# Patient Record
Sex: FEMALE | Race: WHITE | NOT HISPANIC OR LATINO | Employment: FULL TIME | URBAN - NONMETROPOLITAN AREA
[De-identification: names, ages, dates, MRNs, and addresses within clinical notes are randomized per-mention and may not be internally consistent; named-entity substitution may affect disease eponyms.]

---

## 2017-11-07 ENCOUNTER — TRANSCRIBE ORDERS (OUTPATIENT)
Dept: MAMMOGRAPHY | Facility: HOSPITAL | Age: 37
End: 2017-11-07

## 2017-11-07 DIAGNOSIS — Z12.39 BREAST CANCER SCREENING: Primary | ICD-10-CM

## 2017-12-12 ENCOUNTER — HOSPITAL ENCOUNTER (OUTPATIENT)
Dept: MAMMOGRAPHY | Facility: HOSPITAL | Age: 37
Discharge: HOME | End: 2017-12-12
Admitting: OBSTETRICS & GYNECOLOGY

## 2017-12-12 DIAGNOSIS — Z12.39 BREAST CANCER SCREENING: ICD-10-CM

## 2017-12-12 PROCEDURE — G0202 SCR MAMMO BI INCL CAD: HCPCS

## 2017-12-12 PROCEDURE — 77063 BREAST TOMOSYNTHESIS BI: CPT

## 2019-03-18 DIAGNOSIS — M25.522 ARTHRALGIA OF LEFT ELBOW: Primary | ICD-10-CM

## 2019-03-19 ENCOUNTER — TELEPHONE (OUTPATIENT)
Dept: ORTHOPEDIC SURGERY | Facility: CLINIC | Age: 39
End: 2019-03-19

## 2019-03-19 ENCOUNTER — OFFICE VISIT (OUTPATIENT)
Dept: ORTHOPEDIC SURGERY | Facility: CLINIC | Age: 39
End: 2019-03-19

## 2019-03-19 VITALS — WEIGHT: 109.9 LBS | RESPIRATION RATE: 18 BRPM | HEIGHT: 65 IN | BODY MASS INDEX: 18.31 KG/M2

## 2019-03-19 DIAGNOSIS — M77.11 RIGHT TENNIS ELBOW: ICD-10-CM

## 2019-03-19 DIAGNOSIS — M25.521 ARTHRALGIA OF RIGHT ELBOW: Primary | ICD-10-CM

## 2019-03-19 PROCEDURE — 99213 OFFICE O/P EST LOW 20 MIN: CPT | Performed by: PHYSICIAN ASSISTANT

## 2019-03-19 RX ORDER — IBUPROFEN 800 MG/1
TABLET ORAL
COMMUNITY
Start: 2019-01-13 | End: 2019-03-19 | Stop reason: ALTCHOICE

## 2019-03-19 NOTE — PROGRESS NOTES
Subjective   Patient ID: Kate Mckeon is a 38 y.o. right hand dominant female  Pain of the Right Elbow (Patient here today for right elbow pain x several months. No known injury.)         History of Present Illness  ` Patient presents with complaints of right elbow pain that has been ongoing for several months.  There is been no injury or trauma.  She denies numbness or tingling to the upper extremity.  She states daily activities such as opening doors, fixing her hair and getting dressed intensify her symptoms.  Pain Score: (8 with activity, 4-5 at rest)  Pain Location: Elbow  Pain Orientation: Right     Pain Descriptors: Aching, Stabbing, Throbbing  Pain Frequency: Constant/continuous  Pain Onset: Ongoing  Date Pain First Started: (several months)  Clinical Progression: Gradually worsening  Aggravating Factors: (lifting anything, difficulty getting comfortable at night)        Pain Intervention(s): Home medication, Rest, Cold applied, Heat applied(ibuprofen(effective), OTC elbow brace(not effective))  Result of Injury: No       History reviewed. No pertinent past medical history.     Past Surgical History:   Procedure Laterality Date   • DIAGNOSTIC LAPAROSCOPY      for endometriosis   • TUBAL ABDOMINAL LIGATION         Family History   Problem Relation Age of Onset   • Hypertension Father    • Cancer Paternal Grandmother         non-hodgkins lymphoma   • Cancer Paternal Grandfather         prostate       Social History     Socioeconomic History   • Marital status:      Spouse name: Not on file   • Number of children: Not on file   • Years of education: Not on file   • Highest education level: Not on file   Social Needs   • Financial resource strain: Not on file   • Food insecurity - worry: Not on file   • Food insecurity - inability: Not on file   • Transportation needs - medical: Not on file   • Transportation needs - non-medical: Not on file   Occupational History   • Occupation: Vet  "tech/     Employer: [employer]   Tobacco Use   • Smoking status: Former Smoker   • Smokeless tobacco: Never Used   Substance and Sexual Activity   • Alcohol use: Yes     Frequency: Monthly or less   • Drug use: No   • Sexual activity: Not on file   Other Topics Concern   • Not on file   Social History Narrative    Right hand dominant       No current outpatient medications on file.    Allergies   Allergen Reactions   • Demerol [Meperidine] Nausea And Vomiting       Review of Systems   Constitutional: Negative for fever.   HENT: Negative for voice change.    Eyes: Negative for visual disturbance.   Respiratory: Negative for shortness of breath.    Cardiovascular: Negative for chest pain.   Gastrointestinal: Negative for abdominal distention and abdominal pain.   Genitourinary: Negative for dysuria.   Musculoskeletal: Positive for arthralgias. Negative for gait problem and joint swelling.   Skin: Negative for rash.   Neurological: Negative for speech difficulty.   Hematological: Does not bruise/bleed easily.   Psychiatric/Behavioral: Negative for confusion.       Objective   Resp 18   Ht 165.1 cm (65\")   Wt 49.9 kg (109 lb 14.4 oz)   BMI 18.29 kg/m²    Physical Exam   Constitutional: She appears well-nourished.   Eyes: Conjunctivae are normal.   Neck: No tracheal deviation present.   Pulmonary/Chest: Effort normal.   Musculoskeletal:        Right elbow: She exhibits normal range of motion, no swelling, no effusion and no deformity. Tenderness found. Lateral epicondyle tenderness noted. No radial head, no medial epicondyle and no olecranon process tenderness noted.        Right hand: She exhibits normal capillary refill and no swelling. Normal sensation noted. Normal strength noted. She exhibits no thumb/finger opposition.   Neurological: She is alert.   Skin: Capillary refill takes less than 2 seconds.   Psychiatric: She has a normal mood and affect.   Vitals reviewed.    Right Elbow " Exam     Range of Motion   Extension: 0   Flexion: 120   Pronation: normal   Supination: normal     Muscle Strength   The patient has normal right elbow strength.    Tests   Varus: negative  Valgus: negative  Tinel's sign (cubital tunnel): negative    Other   Erythema: absent             Neurologic Exam         Assessment/Plan   Independent Review of Radiographic Studies:    Shows no acute fracture or dislocation.      Procedures       Kate was seen today for pain.    Diagnoses and all orders for this visit:    Arthralgia of right elbow  -     XR Elbow 3+ View Right    Right tennis elbow  -     Ambulatory Referral to Physical Therapy Evaluate and treat, Ortho; Heat       Orthopedic activities reviewed and patient expressed appreciation  Discussion of orthopedic goals  Risk, benefits, and merits of treatment alternatives reviewed with the patient and questions answered  Physical therapy referral given    Recommendations/Plan:  Exercise, medications, injections, other patient advice, and return appointment as noted.  Patient is encouraged to call or return for any issues or concerns.  If no improvement with hand OT and gauntlet wrist brace will consider right elbow lateral epicondylitis injection  Patient agreeable to call or return sooner for any concerns.

## 2019-03-19 NOTE — TELEPHONE ENCOUNTER
Patient called the office in regards to her custom wrist order to take to [facility]. She said that it said left wrist but it needs to be changed to right wrist and sent to [facility].

## 2021-05-18 ENCOUNTER — OFFICE VISIT (OUTPATIENT)
Dept: FAMILY MEDICINE CLINIC | Facility: CLINIC | Age: 41
End: 2021-05-18

## 2021-05-18 VITALS
WEIGHT: 116 LBS | TEMPERATURE: 97.8 F | DIASTOLIC BLOOD PRESSURE: 62 MMHG | HEART RATE: 74 BPM | BODY MASS INDEX: 19.33 KG/M2 | HEIGHT: 65 IN | RESPIRATION RATE: 16 BRPM | OXYGEN SATURATION: 99 % | SYSTOLIC BLOOD PRESSURE: 98 MMHG

## 2021-05-18 DIAGNOSIS — Z11.59 ENCOUNTER FOR HEPATITIS C SCREENING TEST FOR LOW RISK PATIENT: ICD-10-CM

## 2021-05-18 DIAGNOSIS — Z12.31 ENCOUNTER FOR SCREENING MAMMOGRAM FOR MALIGNANT NEOPLASM OF BREAST: ICD-10-CM

## 2021-05-18 DIAGNOSIS — R81 GLUCOSE FOUND IN URINE ON EXAMINATION: ICD-10-CM

## 2021-05-18 DIAGNOSIS — Z83.49 FAMILY HISTORY OF THYROID DISEASE: ICD-10-CM

## 2021-05-18 DIAGNOSIS — Z13.29 SCREENING FOR ENDOCRINE DISORDER: ICD-10-CM

## 2021-05-18 DIAGNOSIS — R53.83 FATIGUE, UNSPECIFIED TYPE: ICD-10-CM

## 2021-05-18 DIAGNOSIS — R51.9 CHRONIC NONINTRACTABLE HEADACHE, UNSPECIFIED HEADACHE TYPE: ICD-10-CM

## 2021-05-18 DIAGNOSIS — G89.29 CHRONIC NONINTRACTABLE HEADACHE, UNSPECIFIED HEADACHE TYPE: ICD-10-CM

## 2021-05-18 DIAGNOSIS — Z13.220 SCREENING FOR HYPERLIPIDEMIA: ICD-10-CM

## 2021-05-18 DIAGNOSIS — K59.00 CONSTIPATION, UNSPECIFIED CONSTIPATION TYPE: ICD-10-CM

## 2021-05-18 DIAGNOSIS — Z00.00 ANNUAL PHYSICAL EXAM: Primary | ICD-10-CM

## 2021-05-18 LAB — HBA1C MFR BLD: 5.1 %

## 2021-05-18 PROCEDURE — 83036 HEMOGLOBIN GLYCOSYLATED A1C: CPT | Performed by: NURSE PRACTITIONER

## 2021-05-18 PROCEDURE — 99396 PREV VISIT EST AGE 40-64: CPT | Performed by: NURSE PRACTITIONER

## 2021-05-18 RX ORDER — DOCUSATE SODIUM 250 MG
250 CAPSULE ORAL 2 TIMES DAILY PRN
Qty: 30 CAPSULE | Refills: 0 | Status: SHIPPED | OUTPATIENT
Start: 2021-05-18

## 2021-05-18 NOTE — PROGRESS NOTES
New Patient History and Physical      Referring Physician: Herminia Esteban APRN    Subjective     Chief Complaint:    Chief Complaint   Patient presents with   • Establish Care     Patient had glucose in urine at  and was referred to check for diabetes.        History of Present Illness:   Here to establish care. Not seen anyone for several years.  She was seen at  for UTI. She had taken azo. UA had 1+ glucose in it. She denies personal hx of diabetes. Sometimes excessive thirst. No polyuria. No hx og gestational diabetes. No problems with vision other than normal nearsightedness.   She has hx of chronic headaches. Gets cluster headaches and migraines at times. Worse with weather changes. She drinks 8oz of water per day. No allergies problems. No neck issues. Sometimes wakes up with a headache. She takes motrin and tylenol. Denies headaches being a bother to her daily life.   She has been constipated over the past week. No diet change. BM yesterday. Has mild abdominal pain. No FH of colon cancer. Has not taken anything for her symptoms. Pain is cramping and sharp.   LMP was 4/27.   Last PAP was 2019, normal  Has hx of breast lump. Has had mammogram a few years back due to lump that resolved. Has not had repeat mammo since being 40.     Review of Systems   Constitutional: Negative for fatigue, unexpected weight gain and unexpected weight loss.   HENT: Negative for congestion and sore throat.    Eyes: Negative for discharge and visual disturbance.   Respiratory: Negative for cough and shortness of breath.    Cardiovascular: Negative for chest pain, palpitations and leg swelling.   Gastrointestinal: Positive for abdominal pain and constipation. Negative for diarrhea, nausea, vomiting and GERD.   Endocrine: Negative for polydipsia, polyphagia and polyuria.   Genitourinary: Negative for decreased urine volume, dysuria, frequency, hematuria and urgency.   Musculoskeletal: Negative for arthralgias, joint  "swelling and myalgias.   Skin: Negative for rash.   Neurological: Positive for headache. Negative for dizziness and light-headedness.   Hematological: Negative for adenopathy. Does not bruise/bleed easily.   Psychiatric/Behavioral: Negative for depressed mood. The patient is not nervous/anxious.         Past Medical History: History reviewed. No pertinent past medical history.    Past Surgical History:  Past Surgical History:   Procedure Laterality Date   • DIAGNOSTIC LAPAROSCOPY      for endometriosis   • TUBAL ABDOMINAL LIGATION         Family History: family history includes Cancer in her paternal grandfather and paternal grandmother; Hypertension in her father.    Social History:  reports that she has quit smoking. She has never used smokeless tobacco. She reports current alcohol use. She reports that she does not use drugs.    Medications:    Current Outpatient Medications:   •  docusate sodium (COLACE) 250 MG capsule, Take 1 capsule by mouth 2 (Two) Times a Day As Needed for Constipation., Disp: 30 capsule, Rfl: 0    Allergies:  Allergies   Allergen Reactions   • Demerol [Meperidine] Nausea And Vomiting       Objective     Vital Signs:   Vitals:    05/18/21 1108   BP: 98/62   Pulse: 74   Resp: 16   Temp: 97.8 °F (36.6 °C)   SpO2: 99%   Weight: 52.6 kg (116 lb)   Height: 165.1 cm (65\")       Physical Exam:    Physical Exam  Vitals and nursing note reviewed.   Constitutional:       Appearance: She is well-developed.   HENT:      Head: Normocephalic and atraumatic.      Right Ear: Tympanic membrane, ear canal and external ear normal.      Left Ear: Tympanic membrane, ear canal and external ear normal.      Nose: Nose normal.      Mouth/Throat:      Pharynx: Uvula midline.   Eyes:      General: Lids are normal. No scleral icterus.     Conjunctiva/sclera: Conjunctivae normal.      Pupils: Pupils are equal, round, and reactive to light.   Neck:      Thyroid: No thyromegaly.      Vascular: No carotid bruit.      " Trachea: No tracheal deviation.   Cardiovascular:      Rate and Rhythm: Normal rate and regular rhythm.      Heart sounds: Normal heart sounds. No murmur heard.   No friction rub. No gallop.    Pulmonary:      Effort: Pulmonary effort is normal.      Breath sounds: Normal breath sounds.   Abdominal:      General: Bowel sounds are normal. There is no distension.      Palpations: Abdomen is soft.      Tenderness: There is no abdominal tenderness.   Musculoskeletal:      Cervical back: Neck supple.   Lymphadenopathy:      Head:      Right side of head: No submental, submandibular, tonsillar, preauricular or posterior auricular adenopathy.      Left side of head: No submental, submandibular, tonsillar, preauricular or posterior auricular adenopathy.      Cervical: No cervical adenopathy.   Skin:     General: Skin is warm and dry.      Findings: No rash.   Neurological:      Mental Status: She is alert and oriented to person, place, and time.      Cranial Nerves: No cranial nerve deficit.      Sensory: No sensory deficit.   Psychiatric:         Behavior: Behavior normal.         Assessment / Plan     Assessment/Plan:   Problem List Items Addressed This Visit     None      Visit Diagnoses     Annual physical exam    -  Primary    Glucose found in urine on examination        Relevant Orders    POC Glycosylated Hemoglobin (Hb A1C) (Completed)    Encounter for screening mammogram for malignant neoplasm of breast        Relevant Orders    Mammo Screening Digital Tomosynthesis Bilateral With CAD    Chronic nonintractable headache, unspecified headache type        Constipation, unspecified constipation type        Relevant Medications    docusate sodium (COLACE) 250 MG capsule    Screening for endocrine disorder        Relevant Orders    TSH Rfx On Abnormal To Free T4    Family history of thyroid disease        Relevant Orders    TSH Rfx On Abnormal To Free T4    Fatigue, unspecified type        Relevant Orders    Comprehensive  Metabolic Panel    CBC Auto Differential    TSH Rfx On Abnormal To Free T4    Vitamin B12    Vitamin D 25 Hydroxy    Folate    Screening for hyperlipidemia        Relevant Orders    Lipid Panel    Encounter for hepatitis C screening test for low risk patient        Relevant Orders    Hepatitis C Antibody        -- physical performed today, encouraged clean eating, whole foods, limit processed and sugary foods, exercise 150min/week as tolerated  -- A1c 5.1. Discussed abnormality in urine was likely due to AZO use  -- labs as above  -- intergrative medicine treatment for headaches discussed  -- matt for constipation, increase water and fiber intake    I have reviewed pertinent health maintenance applicable to this patient.    Follow up:  Return in about 1 year (around 5/18/2022).    Electronically signed by ISABEL Stevenson   05/18/2021 11:22 EDT      Please note that portions of this note may have been completed with a voice recognition program. Efforts were made to edit the dictations, but occasionally words are mistranscribed.

## 2021-05-19 LAB
25(OH)D3+25(OH)D2 SERPL-MCNC: 46.9 NG/ML (ref 30–100)
ALBUMIN SERPL-MCNC: 4.4 G/DL (ref 3.5–5.2)
ALBUMIN/GLOB SERPL: 1.9 G/DL
ALP SERPL-CCNC: 81 U/L (ref 39–117)
ALT SERPL-CCNC: 12 U/L (ref 1–33)
AST SERPL-CCNC: 12 U/L (ref 1–32)
BASOPHILS # BLD AUTO: 0.07 10*3/MM3 (ref 0–0.2)
BASOPHILS NFR BLD AUTO: 0.9 % (ref 0–1.5)
BILIRUB SERPL-MCNC: 0.4 MG/DL (ref 0–1.2)
BUN SERPL-MCNC: 16 MG/DL (ref 6–20)
BUN/CREAT SERPL: 23.9 (ref 7–25)
CALCIUM SERPL-MCNC: 9.8 MG/DL (ref 8.6–10.5)
CHLORIDE SERPL-SCNC: 103 MMOL/L (ref 98–107)
CHOLEST SERPL-MCNC: 150 MG/DL (ref 0–200)
CO2 SERPL-SCNC: 27.8 MMOL/L (ref 22–29)
CREAT SERPL-MCNC: 0.67 MG/DL (ref 0.57–1)
EOSINOPHIL # BLD AUTO: 0.19 10*3/MM3 (ref 0–0.4)
EOSINOPHIL NFR BLD AUTO: 2.4 % (ref 0.3–6.2)
ERYTHROCYTE [DISTWIDTH] IN BLOOD BY AUTOMATED COUNT: 12 % (ref 12.3–15.4)
FOLATE SERPL-MCNC: 10.8 NG/ML (ref 4.78–24.2)
GLOBULIN SER CALC-MCNC: 2.3 GM/DL
GLUCOSE SERPL-MCNC: 63 MG/DL (ref 65–99)
HCT VFR BLD AUTO: 42.6 % (ref 34–46.6)
HCV AB S/CO SERPL IA: 0.1 S/CO RATIO (ref 0–0.9)
HDLC SERPL-MCNC: 63 MG/DL (ref 40–60)
HGB BLD-MCNC: 14.3 G/DL (ref 12–15.9)
IMM GRANULOCYTES # BLD AUTO: 0.03 10*3/MM3 (ref 0–0.05)
IMM GRANULOCYTES NFR BLD AUTO: 0.4 % (ref 0–0.5)
LDLC SERPL CALC-MCNC: 73 MG/DL (ref 0–100)
LYMPHOCYTES # BLD AUTO: 1.51 10*3/MM3 (ref 0.7–3.1)
LYMPHOCYTES NFR BLD AUTO: 19.4 % (ref 19.6–45.3)
MCH RBC QN AUTO: 30.4 PG (ref 26.6–33)
MCHC RBC AUTO-ENTMCNC: 33.6 G/DL (ref 31.5–35.7)
MCV RBC AUTO: 90.6 FL (ref 79–97)
MONOCYTES # BLD AUTO: 0.77 10*3/MM3 (ref 0.1–0.9)
MONOCYTES NFR BLD AUTO: 9.9 % (ref 5–12)
NEUTROPHILS # BLD AUTO: 5.23 10*3/MM3 (ref 1.7–7)
NEUTROPHILS NFR BLD AUTO: 67 % (ref 42.7–76)
NRBC BLD AUTO-RTO: 0 /100 WBC (ref 0–0.2)
PLATELET # BLD AUTO: 271 10*3/MM3 (ref 140–450)
POTASSIUM SERPL-SCNC: 4.6 MMOL/L (ref 3.5–5.2)
PROT SERPL-MCNC: 6.7 G/DL (ref 6–8.5)
RBC # BLD AUTO: 4.7 10*6/MM3 (ref 3.77–5.28)
SODIUM SERPL-SCNC: 138 MMOL/L (ref 136–145)
TRIGL SERPL-MCNC: 68 MG/DL (ref 0–150)
TSH SERPL DL<=0.005 MIU/L-ACNC: 1.05 UIU/ML (ref 0.27–4.2)
VIT B12 SERPL-MCNC: 518 PG/ML (ref 211–946)
VLDLC SERPL CALC-MCNC: 14 MG/DL (ref 5–40)
WBC # BLD AUTO: 7.8 10*3/MM3 (ref 3.4–10.8)

## 2021-05-21 NOTE — PROGRESS NOTES
Labs are unremarkable other than a mildly decreased glucose.  Make sure she is not going prolonged periods of time without eating.

## 2021-09-10 ENCOUNTER — TRANSCRIBE ORDERS (OUTPATIENT)
Dept: LAB | Facility: HOSPITAL | Age: 41
End: 2021-09-10

## 2021-09-10 ENCOUNTER — LAB (OUTPATIENT)
Dept: LAB | Facility: HOSPITAL | Age: 41
End: 2021-09-10

## 2021-09-10 DIAGNOSIS — Z20.822 COVID-19 RULED OUT: ICD-10-CM

## 2021-09-10 DIAGNOSIS — Z20.822 COVID-19 RULED OUT: Primary | ICD-10-CM

## 2021-09-10 PROCEDURE — U0004 COV-19 TEST NON-CDC HGH THRU: HCPCS

## 2021-09-11 LAB — SARS-COV-2 RNA NOSE QL NAA+PROBE: DETECTED

## 2021-09-13 ENCOUNTER — TELEPHONE (OUTPATIENT)
Dept: OTHER | Facility: HOSPITAL | Age: 41
End: 2021-09-13

## 2021-12-26 PROCEDURE — U0004 COV-19 TEST NON-CDC HGH THRU: HCPCS | Performed by: NURSE PRACTITIONER

## 2021-12-28 ENCOUNTER — OFFICE VISIT (OUTPATIENT)
Dept: FAMILY MEDICINE CLINIC | Facility: CLINIC | Age: 41
End: 2021-12-28

## 2021-12-28 VITALS
SYSTOLIC BLOOD PRESSURE: 126 MMHG | OXYGEN SATURATION: 99 % | TEMPERATURE: 97.1 F | HEART RATE: 71 BPM | DIASTOLIC BLOOD PRESSURE: 90 MMHG

## 2021-12-28 DIAGNOSIS — J02.9 SORE THROAT: ICD-10-CM

## 2021-12-28 DIAGNOSIS — R49.0 SOFT HOARSE VOICE: Primary | ICD-10-CM

## 2021-12-28 DIAGNOSIS — Z20.822 EXPOSURE TO COVID-19 VIRUS: ICD-10-CM

## 2021-12-28 DIAGNOSIS — J34.89 NASAL CONGESTION WITH RHINORRHEA: ICD-10-CM

## 2021-12-28 DIAGNOSIS — R05.9 COUGH: ICD-10-CM

## 2021-12-28 DIAGNOSIS — R09.81 NASAL CONGESTION WITH RHINORRHEA: ICD-10-CM

## 2021-12-28 PROCEDURE — 99213 OFFICE O/P EST LOW 20 MIN: CPT

## 2021-12-28 PROCEDURE — U0004 COV-19 TEST NON-CDC HGH THRU: HCPCS

## 2021-12-28 RX ORDER — DEXAMETHASONE 0.5 MG/1
TABLET ORAL
Qty: 21 TABLET | Refills: 0 | Status: SHIPPED | OUTPATIENT
Start: 2021-12-28 | End: 2022-01-11

## 2021-12-28 NOTE — PROGRESS NOTES
Acute Office Visit      Patient Name: Kate Mckeon  : 1980   MRN: [id number]     Chief Complaint:    Chief Complaint   Patient presents with   • Nasal Congestion     no drainage    • Sore Throat   • Cough     dry tight    • Shortness of Breath     when up moiving around        History of Present Illness: Kate Mckeon is a 41 y.o. female who is here today for nasal congestion, sore throat, cough, and shortness of breath on exertion.  She reports that there was a coworker at work who was positive for Covid that she was exposed to.  She was tested on Friday and had a negative result.  On  she went to [facility] and had a negative test result that was given to her on Monday.  However, later that night on  she took a home test and was negative.  On Monday she was in close proximity to a another coworker who tested positive.  The patient reports that her symptoms started Darwin Day.  She notes that her voice is very hoarse and soft, she will have some burning in her chest due to coughing and it is worse in the morning.  She denies facial pain ear pain.  She still has her sense of taste and smell.    Subjective     Review of System: Review of Systems   Constitutional: Negative for chills and fever.   HENT: Positive for congestion and sore throat. Negative for ear pain, postnasal drip, rhinorrhea, sinus pressure and sinus pain.    Respiratory: Positive for cough and shortness of breath (LEDEZMA).    Cardiovascular: Negative for chest pain.   Gastrointestinal: Negative for constipation, diarrhea, nausea and vomiting.   Musculoskeletal: Negative for arthralgias and myalgias.   Neurological: Negative for headaches.      I have reviewed the ROS documented by my clinical staff, updated appropriately and I agree. ISABEL Aguirre    Past Medical History: History reviewed. No pertinent past medical history.    Past Surgical History:   Past Surgical History:   Procedure  Laterality Date   • DIAGNOSTIC LAPAROSCOPY      for endometriosis   • TUBAL ABDOMINAL LIGATION         Family History:   Family History   Problem Relation Age of Onset   • Hypertension Father    • Cancer Paternal Grandmother         non-hodgkins lymphoma   • Cancer Paternal Grandfather         prostate       Social History:   Social History     Socioeconomic History   • Marital status:    Tobacco Use   • Smoking status: Former Smoker   • Smokeless tobacco: Never Used   Substance and Sexual Activity   • Alcohol use: Yes   • Drug use: No   • Sexual activity: Defer       Medications:     Current Outpatient Medications:   •  docusate sodium (COLACE) 250 MG capsule, Take 1 capsule by mouth 2 (Two) Times a Day As Needed for Constipation., Disp: 30 capsule, Rfl: 0  •  dexamethasone (DECADRON) 0.5 MG tablet, 6 po x1d; then 5 po x 1d; then 4 po x 1d; then 3 po x 1d; then 2 po x 1d; then 1 po x1d; then STOP, Disp: 21 tablet, Rfl: 0  •  Lidocaine Viscous HCl (XYLOCAINE) 2 % solution, Take 5 mL by mouth Every 4 (Four) Hours As Needed for Mild Pain ., Disp: 60 mL, Rfl: 0    Allergies:   Allergies   Allergen Reactions   • Demerol [Meperidine] Nausea And Vomiting       Objective     Physical Exam:   Vital Signs:   Vitals:    12/28/21 1525   BP: 126/90   Pulse: 71   Temp: 97.1 °F (36.2 °C)   SpO2: 99%     There is no height or weight on file to calculate BMI.     Physical Exam  Vitals and nursing note reviewed.   Constitutional:       Appearance: Normal appearance.   HENT:      Head: Normocephalic and atraumatic.      Right Ear: Tympanic membrane, ear canal and external ear normal.      Left Ear: Tympanic membrane, ear canal and external ear normal.      Nose: Nose normal.      Mouth/Throat:      Mouth: Mucous membranes are moist.      Pharynx: Oropharynx is clear.   Eyes:      Extraocular Movements: Extraocular movements intact.      Conjunctiva/sclera: Conjunctivae normal.      Pupils: Pupils are equal, round, and reactive  to light.   Neck:      Vascular: No carotid bruit.   Cardiovascular:      Rate and Rhythm: Normal rate and regular rhythm.      Pulses: Normal pulses.      Heart sounds: Normal heart sounds.   Pulmonary:      Effort: Pulmonary effort is normal.      Breath sounds: Normal breath sounds.   Abdominal:      General: Abdomen is flat. Bowel sounds are normal. There is no distension.      Palpations: Abdomen is soft.      Tenderness: There is no abdominal tenderness.   Musculoskeletal:      Cervical back: Neck supple. No tenderness.      Right lower leg: No edema.      Left lower leg: No edema.   Lymphadenopathy:      Cervical: No cervical adenopathy.   Skin:     General: Skin is warm and dry.      Capillary Refill: Capillary refill takes less than 2 seconds.   Neurological:      General: No focal deficit present.      Mental Status: She is alert and oriented to person, place, and time.   Psychiatric:         Mood and Affect: Mood normal.         Behavior: Behavior normal.         Assessment / Plan      Assessment/Plan:   Diagnoses and all orders for this visit:    1. Soft hoarse voice (Primary)  -     COVID-19 PCR, [facility], NP SWAB IN [facility] VIRAL TRANSPORT MEDIA/ORAL SWISH 24-30 HR TAT - Swab, Nasopharynx; Future  -     dexamethasone (DECADRON) 0.5 MG tablet; 6 po x1d; then 5 po x 1d; then 4 po x 1d; then 3 po x 1d; then 2 po x 1d; then 1 po x1d; then STOP  Dispense: 21 tablet; Refill: 0  -     COVID-19 PCR, [facility], NP SWAB IN [facility] VIRAL TRANSPORT MEDIA/ORAL SWISH 24-30 HR TAT - Swab, Nasopharynx    2. Nasal congestion with rhinorrhea  -     COVID-19 PCR, [facility], NP SWAB IN [facility] VIRAL TRANSPORT MEDIA/ORAL SWISH 24-30 HR TAT - Swab, Nasopharynx; Future  -     dexamethasone (DECADRON) 0.5 MG tablet; 6 po x1d; then 5 po x 1d; then 4 po x 1d; then 3 po x 1d; then 2 po x 1d; then 1 po x1d; then STOP  Dispense: 21 tablet; Refill: 0  -     COVID-19 PCR, [facility], NP SWAB IN [facility] VIRAL TRANSPORT MEDIA/ORAL SWISH  24-30 HR TAT - Swab, Nasopharynx    3. Cough  -     COVID-19 PCR, [facility], NP SWAB IN [facility] VIRAL TRANSPORT MEDIA/ORAL SWISH 24-30 HR TAT - Swab, Nasopharynx; Future  -     dexamethasone (DECADRON) 0.5 MG tablet; 6 po x1d; then 5 po x 1d; then 4 po x 1d; then 3 po x 1d; then 2 po x 1d; then 1 po x1d; then STOP  Dispense: 21 tablet; Refill: 0  -     COVID-19 PCR, [facility], NP SWAB IN [facility] VIRAL TRANSPORT MEDIA/ORAL SWISH 24-30 HR TAT - Swab, Nasopharynx    4. Sore throat  -     COVID-19 PCR, [facility], NP SWAB IN [facility] VIRAL TRANSPORT MEDIA/ORAL SWISH 24-30 HR TAT - Swab, Nasopharynx; Future  -     dexamethasone (DECADRON) 0.5 MG tablet; 6 po x1d; then 5 po x 1d; then 4 po x 1d; then 3 po x 1d; then 2 po x 1d; then 1 po x1d; then STOP  Dispense: 21 tablet; Refill: 0  -     COVID-19 PCR, [facility], NP SWAB IN [facility] VIRAL TRANSPORT MEDIA/ORAL SWISH 24-30 HR TAT - Swab, Nasopharynx    5. Exposure to COVID-19 virus  -     COVID-19 PCR, [facility], NP SWAB IN [facility] VIRAL TRANSPORT MEDIA/ORAL SWISH 24-30 HR TAT - Swab, Nasopharynx; Future  -     dexamethasone (DECADRON) 0.5 MG tablet; 6 po x1d; then 5 po x 1d; then 4 po x 1d; then 3 po x 1d; then 2 po x 1d; then 1 po x1d; then STOP  Dispense: 21 tablet; Refill: 0  -     COVID-19 PCR, [facility], NP SWAB IN [facility] V[facility]AL TRANSPORT MEDIA/ORAL SWISH 24-30 HR TAT - Swab, Nasopharynx         1. She is requesting another Covid swab.  Will follow up with results.  2. Will treat symptoms with dexamethasone taper dose pack.  Also recommend using over-the-counter medications to help with symptoms such as Flonase, decongestants, and Tylenol and ibuprofen as needed for pain.      Follow Up:   Return if symptoms worsen or fail to improve.    I spent approximately 20 minutes providing clinical care for this patient; including review of patient's chart and provider documentation, face to face time spent with patient in examination room (obtaining history, performing physical exam,  discussing diagnosis and management options), placing orders, and completing patient documentation.     ISABEL Aguirre  [facility] [place]

## 2021-12-29 ENCOUNTER — TELEPHONE (OUTPATIENT)
Dept: FAMILY MEDICINE CLINIC | Facility: CLINIC | Age: 41
End: 2021-12-29

## 2021-12-29 LAB — SARS-COV-2 RNA NOSE QL NAA+PROBE: NOT DETECTED

## 2021-12-29 NOTE — TELEPHONE ENCOUNTER
Caller: Kate Mckeon    Relationship: Self    Best call back number: [phone]    Caller requesting test results: YES    What test was performed: COVID -19 TEST    When was the test performed: 12/28/21

## 2022-01-11 ENCOUNTER — OFFICE VISIT (OUTPATIENT)
Dept: ORTHOPEDIC SURGERY | Facility: CLINIC | Age: 42
End: 2022-01-11

## 2022-01-11 VITALS — HEIGHT: 65 IN | BODY MASS INDEX: 19.39 KG/M2 | TEMPERATURE: 98.4 F | WEIGHT: 116.4 LBS

## 2022-01-11 DIAGNOSIS — M25.531 ARTHRALGIA OF RIGHT WRIST: Primary | ICD-10-CM

## 2022-01-11 DIAGNOSIS — M77.8 RIGHT WRIST TENDINITIS: ICD-10-CM

## 2022-01-11 DIAGNOSIS — S66.911A WRIST STRAIN, RIGHT, INITIAL ENCOUNTER: ICD-10-CM

## 2022-01-11 PROCEDURE — 99203 OFFICE O/P NEW LOW 30 MIN: CPT | Performed by: PHYSICIAN ASSISTANT

## 2022-01-11 NOTE — PROGRESS NOTES
Subjective   Patient ID: Kate Mckeon is a 41 y.o. right hand dominant female  Pain of the Right Wrist (States she doesn't know what she has done to it, it has been hurting for a couple months. She's having problems grabbing, pulling or lifting anything. Having has throbbing up her arm.)         History of Present Illness  Presents with right lateral wrist pain ongoing for 1 month. She thinks maybe after manually cleaning her carpet she developed the right wrist arthralgia. Denies numbness, tingling or redness.                                                    History reviewed. No pertinent past medical history.     Past Surgical History:   Procedure Laterality Date   • DIAGNOSTIC LAPAROSCOPY      for endometriosis   • TUBAL ABDOMINAL LIGATION         Family History   Problem Relation Age of Onset   • Hypertension Father    • Cancer Paternal Grandmother         non-hodgkins lymphoma   • Cancer Paternal Grandfather         prostate       Social History     Socioeconomic History   • Marital status:    Tobacco Use   • Smoking status: Former Smoker   • Smokeless tobacco: Never Used   Substance and Sexual Activity   • Alcohol use: Yes   • Drug use: No   • Sexual activity: Defer         Current Outpatient Medications:   •  docusate sodium (COLACE) 250 MG capsule, Take 1 capsule by mouth 2 (Two) Times a Day As Needed for Constipation., Disp: 30 capsule, Rfl: 0    Allergies   Allergen Reactions   • Demerol [Meperidine] Nausea And Vomiting       Review of Systems   Constitutional: Negative for fever.   HENT: Negative for dental problem and voice change.    Eyes: Negative for visual disturbance.   Respiratory: Negative for shortness of breath.    Cardiovascular: Negative for chest pain.   Gastrointestinal: Negative for abdominal pain.   Genitourinary: Negative for dysuria.   Musculoskeletal: Positive for arthralgias (right wrist). Negative for gait problem and joint swelling.   Skin: Negative for rash.  "  Neurological: Negative for speech difficulty.   Hematological: Does not bruise/bleed easily.   Psychiatric/Behavioral: Negative for confusion.       I have reviewed the medical and surgical history, family history, social history, medications, and/or allergies, and the review of systems of this report.    Objective   Temp 98.4 °F (36.9 °C)   Ht 165.1 cm (65\")   Wt 52.8 kg (116 lb 6.4 oz)   BMI 19.37 kg/m²    Physical Exam  Vitals and nursing note reviewed.   Constitutional:       Appearance: Normal appearance.   Pulmonary:      Effort: Pulmonary effort is normal.   Musculoskeletal:      Right elbow: No swelling or deformity. Normal range of motion. No tenderness.      Right wrist: Tenderness present. No swelling, deformity, snuff box tenderness or crepitus. Normal pulse.      Right hand: Normal strength. Normal sensation. There is no disruption of two-point discrimination. Normal capillary refill. Normal pulse.   Neurological:      Mental Status: She is alert and oriented to person, place, and time.       Right Elbow Exam     Range of Motion   The patient has normal right elbow ROM.    Muscle Strength   The patient has normal right elbow strength.    Tests   Tinel's sign (cubital tunnel): negative    Other   Sensation: normal             Neurologic Exam     Mental Status   Oriented to person, place, and time.        No anatomical snuffbox ttp   Neg. Aneesh's test      Assessment/Plan   Independent Review of Radiographic Studies:    X-ray of the right wrist 3 view performed in the office independently reviewed for the evaluation of wrist pain.  No comparison films available to view.  No acute osseous abnormality noted      Procedures       Diagnoses and all orders for this visit:    1. Arthralgia of right wrist (Primary)  -     XR Wrist 3+ View Right; Future    2. Right wrist tendinitis    3. Wrist strain, right, initial encounter       Orthopedic activities reviewed and patient expressed appreciation  Discussion " of orthopedic goals  Risk, benefits, and merits of treatment alternatives reviewed with the patient and questions answered  Use brace as instructed  Reduced physical activity as appropriate  Weight bearing parameters reviewed  Ice, heat, and/or modalities as beneficial    Recommendations/Plan:  Exercise, medications, injections, other patient advice, and return appointment as noted.  Patient is encouraged to call or return for any issues or concerns.   Use otc motrin as directed otc  Patient was given a velcro brace to wear 24 hours per day.   Follow up in 2 weeks may enroll in PT  Patient agreeable to call or return sooner for any concerns.               EMR Dragon-transcription disclaimer:  This encounter note is an electronic transcription of spoken language to printed text.  Electronic transcription of spoken language may permit erroneous or at times nonsensical words or phrases to be inadvertently transcribed.  Although I have reviewed the note for such errors, some may still exist

## 2022-01-28 ENCOUNTER — OFFICE VISIT (OUTPATIENT)
Dept: FAMILY MEDICINE CLINIC | Facility: CLINIC | Age: 42
End: 2022-01-28

## 2022-01-28 VITALS
HEIGHT: 65 IN | TEMPERATURE: 97.8 F | DIASTOLIC BLOOD PRESSURE: 61 MMHG | BODY MASS INDEX: 19.26 KG/M2 | HEART RATE: 72 BPM | SYSTOLIC BLOOD PRESSURE: 91 MMHG | OXYGEN SATURATION: 98 % | WEIGHT: 115.6 LBS

## 2022-01-28 DIAGNOSIS — R05.9 COUGH: ICD-10-CM

## 2022-01-28 DIAGNOSIS — Z20.822 EXPOSURE TO CONFIRMED CASE OF COVID-19: Primary | ICD-10-CM

## 2022-01-28 DIAGNOSIS — H65.02 NON-RECURRENT ACUTE SEROUS OTITIS MEDIA OF LEFT EAR: ICD-10-CM

## 2022-01-28 LAB — SARS-COV-2 RNA PNL SPEC NAA+PROBE: DETECTED

## 2022-01-28 PROCEDURE — 99212 OFFICE O/P EST SF 10 MIN: CPT

## 2022-01-28 PROCEDURE — 87635 SARS-COV-2 COVID-19 AMP PRB: CPT

## 2022-01-28 RX ORDER — CEFDINIR 300 MG/1
300 CAPSULE ORAL 2 TIMES DAILY
Qty: 14 CAPSULE | Refills: 0 | Status: SHIPPED | OUTPATIENT
Start: 2022-01-28 | End: 2022-02-08

## 2022-01-28 NOTE — PROGRESS NOTES
Acute Office Visit      Patient Name: Kate Mckeon  : 1980   MRN: [id number]     Chief Complaint:    Chief Complaint   Patient presents with   • Cough     Patient complains of cough, headache, and body aches.   • Headache       History of Present Illness: Kate Mckeon is a 41 y.o. female who is here today for cough and headache.  Patient states that she has had 2 exposures, her first 1 was on Monday and then her second exposure was on Thursday.  Her  tested positive today for Covid.  She reports that her symptoms started Wednesday with a nonproductive cough, intermittent sore throat, mild congestion, generalized body aches, and headache.  No fever, some chills, and today has noticed some ear pain.  She reports that she still has her sense of taste and smell.  She has had Covid previously in September of last year.    Subjective     Review of System: Review of Systems   Constitutional: Positive for chills. Negative for fatigue and fever.   HENT: Positive for congestion, ear pain and sore throat. Negative for postnasal drip, rhinorrhea, sinus pressure and sinus pain.    Respiratory: Positive for cough. Negative for shortness of breath.    Cardiovascular: Negative for chest pain.   Gastrointestinal: Negative for abdominal pain, diarrhea, nausea and vomiting.   Musculoskeletal: Positive for myalgias.   Neurological: Positive for headaches.      I have reviewed the ROS documented by my clinical staff, updated appropriately and I agree. ISABEL Aguirre    Past Medical History: History reviewed. No pertinent past medical history.    Past Surgical History:   Past Surgical History:   Procedure Laterality Date   • DIAGNOSTIC LAPAROSCOPY      for endometriosis   • TUBAL ABDOMINAL LIGATION         Family History:   Family History   Problem Relation Age of Onset   • Hypertension Father    • Cancer Paternal Grandmother         non-hodgkins lymphoma   • Cancer Paternal Grandfather          "prostate       Social History:   Social History     Socioeconomic History   • Marital status:    Tobacco Use   • Smoking status: Former Smoker   • Smokeless tobacco: Never Used   Substance and Sexual Activity   • Alcohol use: Yes   • Drug use: No   • Sexual activity: Defer       Medications:     Current Outpatient Medications:   •  docusate sodium (COLACE) 250 MG capsule, Take 1 capsule by mouth 2 (Two) Times a Day As Needed for Constipation., Disp: 30 capsule, Rfl: 0  •  cefdinir (OMNICEF) 300 MG capsule, Take 1 capsule by mouth 2 (Two) Times a Day., Disp: 14 capsule, Rfl: 0    Allergies:   Allergies   Allergen Reactions   • Demerol [Meperidine] Nausea And Vomiting       Objective     Physical Exam:   Vital Signs:   Vitals:    01/28/22 1306   BP: 91/61   BP Location: Right arm   Patient Position: Sitting   Cuff Size: Adult   Pulse: 72   Temp: 97.8 °F (36.6 °C)   TempSrc: Infrared   SpO2: 98%   Weight: 52.4 kg (115 lb 9.6 oz)   Height: 165.1 cm (65\")     Body mass index is 19.24 kg/m².     Physical Exam  Vitals and nursing note reviewed.   Constitutional:       Appearance: Normal appearance.   HENT:      Head: Normocephalic and atraumatic.      Right Ear: Tympanic membrane, ear canal and external ear normal.      Left Ear: Ear canal and external ear normal. A middle ear effusion is present. Tympanic membrane is erythematous.      Nose: Nose normal.      Mouth/Throat:      Mouth: Mucous membranes are moist.      Pharynx: Oropharynx is clear.   Eyes:      Extraocular Movements: Extraocular movements intact.      Conjunctiva/sclera: Conjunctivae normal.      Pupils: Pupils are equal, round, and reactive to light.   Neck:      Vascular: No carotid bruit.   Cardiovascular:      Rate and Rhythm: Normal rate and regular rhythm.      Pulses: Normal pulses.      Heart sounds: Normal heart sounds.   Pulmonary:      Effort: Pulmonary effort is normal.      Breath sounds: Normal breath sounds.   Abdominal:      General: " Abdomen is flat. Bowel sounds are normal. There is no distension.      Palpations: Abdomen is soft.      Tenderness: There is no abdominal tenderness.   Musculoskeletal:      Cervical back: Neck supple. No tenderness.      Right lower leg: No edema.      Left lower leg: No edema.   Lymphadenopathy:      Cervical: No cervical adenopathy.   Skin:     General: Skin is warm and dry.      Capillary Refill: Capillary refill takes less than 2 seconds.   Neurological:      General: No focal deficit present.      Mental Status: She is alert and oriented to person, place, and time.   Psychiatric:         Mood and Affect: Mood normal.         Behavior: Behavior normal.         Assessment / Plan      Assessment/Plan:   Diagnoses and all orders for this visit:    1. Exposure to confirmed case of COVID-19 (Primary)  -     COVID-19,[facility] IN-HOUSE,Nasal Swab No Transport Media 3-4 HR TAT - Swab, Nasal Cavity    2. Cough  -     COVID-19,[facility] IN-HOUSE,Nasal Swab No Transport Media 3-4 HR TAT - Swab, Nasal Cavity    3. Non-recurrent acute serous otitis media of left ear  -     cefdinir (OMNICEF) 300 MG capsule; Take 1 capsule by mouth 2 (Two) Times a Day.  Dispense: 14 capsule; Refill: 0         1. Rapid Covid swab obtained.  Will follow up with results.  2. Patient has pain to left ear.  TM appears erythematous with serous fluid noted behind it.  Will treat this with cefdinir for 7 days.      Follow Up:   Return if symptoms worsen or fail to improve.    I spent approximately 15   minutes providing clinical care for this patient; including review of patient's chart and provider documentation, face to face time spent with patient in examination room (obtaining history, performing physical exam, discussing diagnosis and management options), placing orders, and completing patient documentation.     ISABEL Aguirre  [facility] KEELEY Tena

## 2022-02-08 ENCOUNTER — OFFICE VISIT (OUTPATIENT)
Dept: ORTHOPEDIC SURGERY | Facility: CLINIC | Age: 42
End: 2022-02-08

## 2022-02-08 VITALS — BODY MASS INDEX: 19.83 KG/M2 | HEIGHT: 65 IN | WEIGHT: 119 LBS

## 2022-02-08 DIAGNOSIS — S66.911A WRIST STRAIN, RIGHT, INITIAL ENCOUNTER: ICD-10-CM

## 2022-02-08 DIAGNOSIS — M25.531 ARTHRALGIA OF RIGHT WRIST: Primary | ICD-10-CM

## 2022-02-08 DIAGNOSIS — M77.8 RIGHT WRIST TENDINITIS: ICD-10-CM

## 2022-02-08 PROCEDURE — 99213 OFFICE O/P EST LOW 20 MIN: CPT | Performed by: PHYSICIAN ASSISTANT

## 2022-02-08 NOTE — PROGRESS NOTES
Subjective   Patient ID: Kate Mckeon is a 41 y.o. right hand dominant female  Follow-up of the Right Wrist (States she wore her brace for two weeks all the time, and after that has worn it on and off. When she picks something up without the brace she is unable to do it. She is still having a lot of pain.)         History of Present Illness  Patient is following up for scheduled visit for right lateral wrist arthralgia that has been ongoing for several months.  She states she did wear the wrist immobilizer as directed for the last 2 weeks but is still experiencing pain to the right wrist.                                                 History reviewed. No pertinent past medical history.     Past Surgical History:   Procedure Laterality Date   • DIAGNOSTIC LAPAROSCOPY      for endometriosis   • TUBAL ABDOMINAL LIGATION         Family History   Problem Relation Age of Onset   • Hypertension Father    • Cancer Paternal Grandmother         non-hodgkins lymphoma   • Cancer Paternal Grandfather         prostate       Social History     Socioeconomic History   • Marital status:    Tobacco Use   • Smoking status: Former Smoker   • Smokeless tobacco: Never Used   Substance and Sexual Activity   • Alcohol use: Yes   • Drug use: No   • Sexual activity: Defer         Current Outpatient Medications:   •  docusate sodium (COLACE) 250 MG capsule, Take 1 capsule by mouth 2 (Two) Times a Day As Needed for Constipation., Disp: 30 capsule, Rfl: 0    Allergies   Allergen Reactions   • Demerol [Meperidine] Nausea And Vomiting       Review of Systems   Constitutional: Negative for fever.   HENT: Negative for dental problem and voice change.    Eyes: Negative for visual disturbance.   Respiratory: Negative for shortness of breath.    Cardiovascular: Negative for chest pain.   Gastrointestinal: Negative for abdominal pain.   Genitourinary: Negative for dysuria.   Musculoskeletal: Positive for arthralgias (right wrist ) and  "joint swelling (right wrist). Negative for gait problem.   Skin: Negative for rash.   Neurological: Negative for speech difficulty.   Hematological: Does not bruise/bleed easily.   Psychiatric/Behavioral: Negative for confusion.       I have reviewed the medical and surgical history, family history, social history, medications, and/or allergies, and the review of systems of this report.    Objective   Ht 165.1 cm (65\")   Wt 54 kg (119 lb)   LMP 01/19/2022 (Exact Date)   BMI 19.80 kg/m²    Physical Exam  Vitals and nursing note reviewed.   Constitutional:       Appearance: Normal appearance.   Cardiovascular:      Pulses: Normal pulses.   Musculoskeletal:      Right wrist: No deformity, effusion, snuff box tenderness or crepitus. Normal pulse.      Right hand: There is no disruption of two-point discrimination. Normal capillary refill. Normal pulse.   Neurological:      Mental Status: She is alert and oriented to person, place, and time.       Ortho Exam     Neurologic Exam     Mental Status   Oriented to person, place, and time.          Patient does have tenderness to palpation along the TFCC region as well as the extensor tendons of the right wrist.  Negative Tinel sign of right wrist    Assessment/Plan   Independent Review of Radiographic Studies:    X-ray of the right wrist 3 view performed in the office independently reviewed for the evaluation of continued right wrist arthralgia. Comparison films are available and reviewed. No evidence of acute fracture or dislocation.      Procedures       Diagnoses and all orders for this visit:    1. Arthralgia of right wrist (Primary)  -     XR Wrist 3+ View Right; Future  -     MRI wrist right wo contrast; Future    2. Right wrist tendinitis  -     MRI wrist right wo contrast; Future    3. Wrist strain, right, initial encounter  -     MRI wrist right wo contrast; Future       Discussion of orthopedic goals  Risk, benefits, and merits of treatment alternatives reviewed " with the patient and questions answered  Reduced physical activity as appropriate  Ice, heat, and/or modalities as beneficial    Recommendations/Plan:  Patient is encouraged to call or return for any issues or concerns.  Patient agreeable to call or return sooner for any concerns.  Follow-up after MRI             EMR Dragon-transcription disclaimer:  This encounter note is an electronic transcription of spoken language to printed text.  Electronic transcription of spoken language may permit erroneous or at times nonsensical words or phrases to be inadvertently transcribed.  Although I have reviewed the note for such errors, some may still exist

## 2022-02-25 ENCOUNTER — HOSPITAL ENCOUNTER (OUTPATIENT)
Dept: MRI IMAGING | Facility: HOSPITAL | Age: 42
Discharge: HOME | End: 2022-02-25
Admitting: PHYSICIAN ASSISTANT

## 2022-02-25 DIAGNOSIS — M77.8 RIGHT WRIST TENDINITIS: ICD-10-CM

## 2022-02-25 DIAGNOSIS — S66.911A WRIST STRAIN, RIGHT, INITIAL ENCOUNTER: ICD-10-CM

## 2022-02-25 DIAGNOSIS — M25.531 ARTHRALGIA OF RIGHT WRIST: ICD-10-CM

## 2022-02-25 PROCEDURE — 73221 MRI JOINT UPR EXTREM W/O DYE: CPT

## 2022-05-24 ENCOUNTER — OFFICE VISIT (OUTPATIENT)
Dept: FAMILY MEDICINE CLINIC | Facility: CLINIC | Age: 42
End: 2022-05-24

## 2022-05-24 VITALS
SYSTOLIC BLOOD PRESSURE: 85 MMHG | TEMPERATURE: 97.7 F | OXYGEN SATURATION: 97 % | WEIGHT: 115.2 LBS | HEART RATE: 75 BPM | DIASTOLIC BLOOD PRESSURE: 55 MMHG | BODY MASS INDEX: 19.17 KG/M2

## 2022-05-24 DIAGNOSIS — R53.83 FATIGUE, UNSPECIFIED TYPE: ICD-10-CM

## 2022-05-24 DIAGNOSIS — Z00.00 ANNUAL PHYSICAL EXAM: Primary | ICD-10-CM

## 2022-05-24 DIAGNOSIS — Z12.31 ENCOUNTER FOR SCREENING MAMMOGRAM FOR MALIGNANT NEOPLASM OF BREAST: ICD-10-CM

## 2022-05-24 DIAGNOSIS — N92.0 MENORRHAGIA WITH REGULAR CYCLE: ICD-10-CM

## 2022-05-24 DIAGNOSIS — R68.82 LOW LIBIDO: ICD-10-CM

## 2022-05-24 PROCEDURE — 99396 PREV VISIT EST AGE 40-64: CPT | Performed by: NURSE PRACTITIONER

## 2022-05-25 LAB
25(OH)D3+25(OH)D2 SERPL-MCNC: 58.9 NG/ML (ref 30–100)
ALBUMIN SERPL-MCNC: 4 G/DL (ref 3.8–4.8)
ALBUMIN/GLOB SERPL: 1.5 {RATIO} (ref 1.2–2.2)
ALP SERPL-CCNC: 79 IU/L (ref 44–121)
ALT SERPL-CCNC: 9 IU/L (ref 0–32)
AST SERPL-CCNC: 10 IU/L (ref 0–40)
BASOPHILS # BLD AUTO: 0.1 X10E3/UL (ref 0–0.2)
BASOPHILS NFR BLD AUTO: 1 %
BILIRUB SERPL-MCNC: 0.3 MG/DL (ref 0–1.2)
BUN SERPL-MCNC: 15 MG/DL (ref 6–24)
BUN/CREAT SERPL: 21 (ref 9–23)
CALCIUM SERPL-MCNC: 9.3 MG/DL (ref 8.7–10.2)
CHLORIDE SERPL-SCNC: 102 MMOL/L (ref 96–106)
CO2 SERPL-SCNC: 25 MMOL/L (ref 20–29)
CREAT SERPL-MCNC: 0.72 MG/DL (ref 0.57–1)
EGFRCR SERPLBLD CKD-EPI 2021: 108 ML/MIN/1.73
EOSINOPHIL # BLD AUTO: 0.3 X10E3/UL (ref 0–0.4)
EOSINOPHIL NFR BLD AUTO: 6 %
ERYTHROCYTE [DISTWIDTH] IN BLOOD BY AUTOMATED COUNT: 11.7 % (ref 11.7–15.4)
ESTRADIOL SERPL-MCNC: 50.4 PG/ML
FSH SERPL-ACNC: 5.7 MIU/ML
GLOBULIN SER CALC-MCNC: 2.7 G/DL (ref 1.5–4.5)
GLUCOSE SERPL-MCNC: 75 MG/DL (ref 65–99)
HCT VFR BLD AUTO: 41.2 % (ref 34–46.6)
HGB BLD-MCNC: 13.7 G/DL (ref 11.1–15.9)
IMM GRANULOCYTES # BLD AUTO: 0 X10E3/UL (ref 0–0.1)
IMM GRANULOCYTES NFR BLD AUTO: 0 %
LH SERPL-ACNC: 8.2 MIU/ML
LYMPHOCYTES # BLD AUTO: 1.3 X10E3/UL (ref 0.7–3.1)
LYMPHOCYTES NFR BLD AUTO: 32 %
MCH RBC QN AUTO: 29.8 PG (ref 26.6–33)
MCHC RBC AUTO-ENTMCNC: 33.3 G/DL (ref 31.5–35.7)
MCV RBC AUTO: 90 FL (ref 79–97)
MONOCYTES # BLD AUTO: 0.6 X10E3/UL (ref 0.1–0.9)
MONOCYTES NFR BLD AUTO: 14 %
NEUTROPHILS # BLD AUTO: 1.9 X10E3/UL (ref 1.4–7)
NEUTROPHILS NFR BLD AUTO: 47 %
PLATELET # BLD AUTO: 247 X10E3/UL (ref 150–450)
POTASSIUM SERPL-SCNC: 4.9 MMOL/L (ref 3.5–5.2)
PROGEST SERPL-MCNC: 0.4 NG/ML
PROT SERPL-MCNC: 6.7 G/DL (ref 6–8.5)
RBC # BLD AUTO: 4.59 X10E6/UL (ref 3.77–5.28)
SODIUM SERPL-SCNC: 139 MMOL/L (ref 134–144)
TSH SERPL DL<=0.005 MIU/L-ACNC: 1.51 UIU/ML (ref 0.45–4.5)
VIT B12 SERPL-MCNC: 557 PG/ML (ref 232–1245)
WBC # BLD AUTO: 4.1 X10E3/UL (ref 3.4–10.8)

## 2022-07-05 ENCOUNTER — HOSPITAL ENCOUNTER (OUTPATIENT)
Dept: MAMMOGRAPHY | Facility: HOSPITAL | Age: 42
Discharge: HOME | End: 2022-07-05
Admitting: NURSE PRACTITIONER

## 2022-07-05 DIAGNOSIS — Z12.31 ENCOUNTER FOR SCREENING MAMMOGRAM FOR MALIGNANT NEOPLASM OF BREAST: ICD-10-CM

## 2022-07-05 PROCEDURE — 77063 BREAST TOMOSYNTHESIS BI: CPT

## 2022-07-05 PROCEDURE — 77063 BREAST TOMOSYNTHESIS BI: CPT | Performed by: RADIOLOGY

## 2022-07-05 PROCEDURE — 77067 SCR MAMMO BI INCL CAD: CPT | Performed by: RADIOLOGY

## 2022-07-05 PROCEDURE — 77067 SCR MAMMO BI INCL CAD: CPT

## 2022-07-13 NOTE — PROGRESS NOTES
Cissy,   Mammogram showed some breast asymmetry. This is pretty common and normally not of concern. However a more detailed mammogram has to be done to ensure that. I see that you already have an appt for the added views in September. If you have any further questions please call or message me.

## 2022-09-13 ENCOUNTER — APPOINTMENT (OUTPATIENT)
Dept: MAMMOGRAPHY | Facility: HOSPITAL | Age: 42
End: 2022-09-13

## 2023-05-30 ENCOUNTER — OFFICE VISIT (OUTPATIENT)
Dept: FAMILY MEDICINE CLINIC | Facility: CLINIC | Age: 43
End: 2023-05-30

## 2023-05-30 VITALS
WEIGHT: 122 LBS | DIASTOLIC BLOOD PRESSURE: 66 MMHG | TEMPERATURE: 98.2 F | SYSTOLIC BLOOD PRESSURE: 90 MMHG | HEART RATE: 74 BPM | BODY MASS INDEX: 20.33 KG/M2 | OXYGEN SATURATION: 99 % | HEIGHT: 65 IN

## 2023-05-30 DIAGNOSIS — G43.109 MIGRAINE WITH AURA AND WITHOUT STATUS MIGRAINOSUS, NOT INTRACTABLE: ICD-10-CM

## 2023-05-30 DIAGNOSIS — R61 NIGHT SWEAT: ICD-10-CM

## 2023-05-30 DIAGNOSIS — Z12.31 ENCOUNTER FOR SCREENING MAMMOGRAM FOR MALIGNANT NEOPLASM OF BREAST: ICD-10-CM

## 2023-05-30 DIAGNOSIS — Z00.00 ANNUAL PHYSICAL EXAM: Primary | ICD-10-CM

## 2023-05-30 PROCEDURE — 99396 PREV VISIT EST AGE 40-64: CPT | Performed by: NURSE PRACTITIONER

## 2023-05-30 RX ORDER — RIMEGEPANT SULFATE 75 MG/75MG
75 TABLET, ORALLY DISINTEGRATING ORAL
Qty: 8 TABLET | Refills: 5 | Status: SHIPPED | OUTPATIENT
Start: 2023-05-30

## 2023-05-30 NOTE — PROGRESS NOTES
"      Subjective     Chief Complaint:    Chief Complaint   Patient presents with   • Annual Exam   • Headache       History of Present Illness:   Here for annual exam  Hx of endometrosis with 2 laps  Last pap a few years ago with Dr. Booker  Has migraines, worse with periods and weather changes, hurts above 1 eye. She will have aura with tunnel vision or sqiggly lines. Normally if she catches it quick enough it will not progress. Happens a few times a month  She has taken imitrex in the past and it made her very nauseated and did not help.   Trouble with waking up in the middle of the night  Night sweats   Periods are normal   Due for mammo, did not have diagnostic mammo done due to cost   Remote tobacco abuse  Walking for exercise, wide variety diet  Wears seat belt  Eye exam UTD  Dental exam UTD       Review of Systems  Gen- No fevers, chills  CV- No chest pain, palpitations  Resp- No cough, dyspnea  GI- No N/V/D, abd pain  Neuro-No dizziness, headaches      I have reviewed and/or updated the patient's past medical, surgical, family, social history and problem list as appropriate.     Medications:    Current Outpatient Medications:   •  docusate sodium (COLACE) 250 MG capsule, Take 1 capsule by mouth 2 (Two) Times a Day As Needed for Constipation., Disp: 30 capsule, Rfl: 0  •  Rimegepant Sulfate (Nurtec) 75 MG tablet dispersible tablet, Take 1 tablet by mouth Every Other Day As Needed (migraine)., Disp: 8 tablet, Rfl: 5    Allergies:  Allergies   Allergen Reactions   • Demerol [Meperidine] Nausea And Vomiting       Objective     Vital Signs:   Vitals:    05/30/23 1510   BP: 90/66   Pulse: 74   Temp: 98.2 °F (36.8 °C)   SpO2: 99%   Weight: 55.3 kg (122 lb)   Height: 165.1 cm (65\")   PainSc: 0-No pain     Body mass index is 20.3 kg/m².    Physical Exam:    Physical Exam  Constitutional:       Appearance: Normal appearance. She is well-developed.   HENT:      Head: Normocephalic and atraumatic.      Right Ear: " Tympanic membrane, ear canal and external ear normal.      Left Ear: Tympanic membrane, ear canal and external ear normal.      Nose: Nose normal.      Mouth/Throat:      Pharynx: Uvula midline.   Eyes:      Pupils: Pupils are equal, round, and reactive to light.   Cardiovascular:      Rate and Rhythm: Normal rate and regular rhythm.      Heart sounds: Normal heart sounds. No murmur heard.    No friction rub. No gallop.   Pulmonary:      Effort: Pulmonary effort is normal.      Breath sounds: Normal breath sounds.   Abdominal:      General: Bowel sounds are normal.      Palpations: Abdomen is soft.      Tenderness: There is no abdominal tenderness.   Musculoskeletal:      Cervical back: Neck supple.   Lymphadenopathy:      Head:      Right side of head: No submental, submandibular, tonsillar, preauricular or posterior auricular adenopathy.      Left side of head: No submental, submandibular, tonsillar, preauricular or posterior auricular adenopathy.      Cervical: No cervical adenopathy.   Skin:     General: Skin is warm and dry.   Neurological:      General: No focal deficit present.      Mental Status: She is alert and oriented to person, place, and time.   Psychiatric:         Mood and Affect: Mood normal.         Behavior: Behavior normal.         Assessment / Plan     Assessment/Plan:   Problem List Items Addressed This Visit    None  Visit Diagnoses     Annual physical exam    -  Primary    Migraine with aura and without status migrainosus, not intractable        Relevant Medications    Rimegepant Sulfate (Nurtec) 75 MG tablet dispersible tablet    Night sweat        Encounter for screening mammogram for malignant neoplasm of breast        Relevant Orders    Mammo Screening Digital Tomosynthesis Bilateral With CAD        -- physical performed today, encouraged clean eating, whole foods, limit processed and sugary foods, exercise 150min/week as tolerated  -- She has failed Imitrex, will trial Nurtec.  Samples  given.  Prescription sent.  Instructed on use.  Continue to work on hydration  -- Repeat mammogram  -- Discussed insomnia and night sweats.  Sleep hygiene discussed.  If worsens can consider pharmacological treatment    Discussed plan of care in detail with pt today; pt verb understanding and agrees.    Follow up:  Yearly    Electronically signed by ISABEL Stevenson   05/30/2023 15:17 EDT      Please note that portions of this note were completed with a voice recognition program.

## 2023-07-27 ENCOUNTER — HOSPITAL ENCOUNTER (OUTPATIENT)
Dept: MAMMOGRAPHY | Facility: HOSPITAL | Age: 43
Discharge: HOME | End: 2023-07-27
Admitting: NURSE PRACTITIONER
Payer: COMMERCIAL

## 2023-07-27 PROCEDURE — 77067 SCR MAMMO BI INCL CAD: CPT

## 2023-07-27 PROCEDURE — 77063 BREAST TOMOSYNTHESIS BI: CPT

## 2023-08-18 ENCOUNTER — TELEPHONE (OUTPATIENT)
Dept: FAMILY MEDICINE CLINIC | Facility: CLINIC | Age: 43
End: 2023-08-18
Payer: COMMERCIAL

## 2023-08-18 NOTE — TELEPHONE ENCOUNTER
Caller: Kate Mckeon    Relationship: Self    Best call back number: [phone]     What is the best time to reach you: ANYTIME, OK TO LEAVE VOICEMAIL.    Who are you requesting to speak with (clinical staff, provider,  specific staff member): CLINICAL STAFF    Do you know the name of the person who called: PATIENT    What was the call regarding: [facility], BUT IT IS $900. PATIENT WANTS TO KNOW IF THERE IS AN ALTERNATIVE OR WHAT ELSE SHE SHOULD DO TO GET THE MEDICATION TO BE AFFORDABLE. PLEASE ADVISE.    Is it okay if the provider responds through MyChart: NO CALL ONLY

## 2023-08-21 NOTE — TELEPHONE ENCOUNTER
Miracle to know if we try to do a prior Auth on this.  She has failed Imitrex.  Does she need to fill Maxalt also?  Please have her come in for more Nurtec samples as well.

## 2023-08-23 DIAGNOSIS — G43.109 MIGRAINE WITH AURA AND WITHOUT STATUS MIGRAINOSUS, NOT INTRACTABLE: Primary | ICD-10-CM

## 2023-08-24 ENCOUNTER — PRIOR AUTHORIZATION (OUTPATIENT)
Dept: FAMILY MEDICINE CLINIC | Facility: CLINIC | Age: 43
End: 2023-08-24
Payer: COMMERCIAL

## 2023-08-24 NOTE — TELEPHONE ENCOUNTER
A PRIOR AUTH HAS BEEN STARTED THROUGH [facility] FOR [facility].    WAITING ON A RESPONSE FROM THE INSURANCE.    Key: [id number]      APPROVED.        PHARMACY AND PATIENT HAVE BEEN NOTIFIED.

## 2023-10-02 ENCOUNTER — OFFICE VISIT (OUTPATIENT)
Dept: FAMILY MEDICINE CLINIC | Facility: CLINIC | Age: 43
End: 2023-10-02
Payer: COMMERCIAL

## 2023-10-02 VITALS
BODY MASS INDEX: 19.83 KG/M2 | SYSTOLIC BLOOD PRESSURE: 98 MMHG | DIASTOLIC BLOOD PRESSURE: 62 MMHG | HEART RATE: 79 BPM | WEIGHT: 119 LBS | OXYGEN SATURATION: 98 % | TEMPERATURE: 97.7 F | HEIGHT: 65 IN

## 2023-10-02 DIAGNOSIS — U07.1 COVID-19: Primary | ICD-10-CM

## 2023-10-02 DIAGNOSIS — R05.8 OTHER COUGH: ICD-10-CM

## 2023-10-02 LAB
EXPIRATION DATE: ABNORMAL
FLUAV AG UPPER RESP QL IA.RAPID: NOT DETECTED
FLUBV AG UPPER RESP QL IA.RAPID: NOT DETECTED
INTERNAL CONTROL: ABNORMAL
Lab: ABNORMAL
SARS-COV-2 AG UPPER RESP QL IA.RAPID: DETECTED

## 2023-10-02 PROCEDURE — 99213 OFFICE O/P EST LOW 20 MIN: CPT | Performed by: NURSE PRACTITIONER

## 2023-10-02 PROCEDURE — 87428 SARSCOV & INF VIR A&B AG IA: CPT | Performed by: NURSE PRACTITIONER

## 2023-10-02 RX ORDER — BETAMETHASONE DIPROPIONATE 0.5 MG/G
LOTION TOPICAL
COMMUNITY
Start: 2023-07-25

## 2023-10-02 NOTE — PROGRESS NOTES
"      Subjective     Chief Complaint:    Chief Complaint   Patient presents with    Cough     Pt states cough is dry and comes and goes     Nasal Congestion    Earache     W/ dizziness       History of Present Illness:   Congestion in head, nasal congestion, behind eyes, ears feel stopped up, low grade fever last night, mild body aches,  No abdominal symptoms  Mild cough  No soa/wheezing   Has taken OTC meds    Review of Systems  As above      I have reviewed and/or updated the patient's past medical, surgical, family, social history and problem list as appropriate.     Medications:    Current Outpatient Medications:     betamethasone dipropionate 0.05 % lotion, , Disp: , Rfl:     docusate sodium (COLACE) 250 MG capsule, Take 1 capsule by mouth 2 (Two) Times a Day As Needed for Constipation., Disp: 30 capsule, Rfl: 0    Rimegepant Sulfate (Nurtec) 75 MG tablet dispersible tablet, Take 1 tablet by mouth Every Other Day As Needed (migraine)., Disp: 8 tablet, Rfl: 5    Allergies:  Allergies   Allergen Reactions    Demerol [Meperidine] Nausea And Vomiting    Prednisone Swelling       Objective     Vital Signs:   Vitals:    10/02/23 1152   BP: 98/62   Pulse: 79   Temp: 97.7 °F (36.5 °C)   SpO2: 98%   Weight: 54 kg (119 lb)   Height: 165.1 cm (65\")     Body mass index is 19.8 kg/m².    Physical Exam:    Physical Exam  Constitutional:       Appearance: Normal appearance. She is well-developed.   HENT:      Head: Normocephalic and atraumatic.      Right Ear: Ear canal and external ear normal. A middle ear effusion is present.      Left Ear: Ear canal and external ear normal. A middle ear effusion is present.      Nose: Nose normal.      Mouth/Throat:      Pharynx: Uvula midline.   Eyes:      Pupils: Pupils are equal, round, and reactive to light.   Cardiovascular:      Rate and Rhythm: Normal rate and regular rhythm.      Heart sounds: Normal heart sounds. No murmur heard.    No friction rub. No gallop.   Pulmonary:      " Effort: Pulmonary effort is normal.      Breath sounds: Normal breath sounds.   Abdominal:      General: Bowel sounds are normal.      Palpations: Abdomen is soft.      Tenderness: There is no abdominal tenderness.   Musculoskeletal:      Cervical back: Neck supple.   Lymphadenopathy:      Head:      Right side of head: No submental, submandibular, tonsillar, preauricular or posterior auricular adenopathy.      Left side of head: No submental, submandibular, tonsillar, preauricular or posterior auricular adenopathy.      Cervical: No cervical adenopathy.   Skin:     General: Skin is warm and dry.   Neurological:      General: No focal deficit present.      Mental Status: She is alert and oriented to person, place, and time.   Psychiatric:         Mood and Affect: Mood normal.         Behavior: Behavior normal.       Assessment / Plan     Assessment/Plan:   Problem List Items Addressed This Visit    None  Visit Diagnoses       COVID-19    -  Primary    Other cough        Relevant Orders    POCT SARS-CoV-2 Antigen SHANNON + Flu (Completed)          -- covid positive, supportive care discussed    Discussed plan of care in detail with pt today; pt verb understanding and agrees.    Follow up:  As needed    Electronically signed by IASBEL Stevenson   10/02/2023 12:06 EDT      Please note that portions of this note were completed with a voice recognition program.

## 2023-11-06 ENCOUNTER — HOSPITAL ENCOUNTER (OUTPATIENT)
Dept: MRI IMAGING | Facility: HOSPITAL | Age: 43
Discharge: HOME | End: 2023-11-06
Admitting: NURSE PRACTITIONER
Payer: COMMERCIAL

## 2023-11-06 DIAGNOSIS — G43.109 MIGRAINE WITH AURA AND WITHOUT STATUS MIGRAINOSUS, NOT INTRACTABLE: ICD-10-CM

## 2023-11-06 PROCEDURE — 70551 MRI BRAIN STEM W/O DYE: CPT

## 2023-11-08 DIAGNOSIS — G43.109 MIGRAINE WITH AURA AND WITHOUT STATUS MIGRAINOSUS, NOT INTRACTABLE: ICD-10-CM

## 2023-11-08 DIAGNOSIS — R90.89 ABNORMAL BRAIN MRI: Primary | ICD-10-CM

## 2023-11-08 NOTE — PROGRESS NOTES
Results discussed with patient, will get her in with neuro for eval given she does not have risk factors for small vessel disease and is young, ? Overread

## 2023-11-28 ENCOUNTER — OFFICE VISIT (OUTPATIENT)
Dept: NEUROLOGY | Facility: CLINIC | Age: 43
End: 2023-11-28
Payer: COMMERCIAL

## 2023-11-28 VITALS
SYSTOLIC BLOOD PRESSURE: 100 MMHG | HEART RATE: 76 BPM | DIASTOLIC BLOOD PRESSURE: 60 MMHG | BODY MASS INDEX: 20.66 KG/M2 | HEIGHT: 65 IN | OXYGEN SATURATION: 99 % | WEIGHT: 124 LBS | TEMPERATURE: 98.4 F

## 2023-11-28 DIAGNOSIS — G43.109 MIGRAINE WITH AURA AND WITHOUT STATUS MIGRAINOSUS, NOT INTRACTABLE: Primary | ICD-10-CM

## 2023-11-28 PROCEDURE — 99204 OFFICE O/P NEW MOD 45 MIN: CPT | Performed by: NURSE PRACTITIONER

## 2023-11-28 RX ORDER — PREDNISONE 5 MG/1
TABLET ORAL
COMMUNITY
Start: 2023-11-16

## 2023-11-28 RX ORDER — DIHYDROERGOTAMINE MESYLATE 4 MG/ML
0.72 SPRAY, METERED NASAL AS NEEDED
Qty: 1 ML | Refills: 0 | COMMUNITY
Start: 2023-11-28

## 2023-11-28 NOTE — PROGRESS NOTES
"     New Patient Office Visit      Patient Name: Kate Mckeon  : 1980   MRN: [id number]     Referring Physician: Deann Mirza APRN    Chief Complaint:    Chief Complaint   Patient presents with    Establish Care     Migraine; patient reports 3/30 migraine days; last eye exam ; patient has tried and failed Imitrex and OTC medications; patient reports relief of symptoms with Nurtec       History of Present Illness: Kate Mkceon is a 43 y.o. female who is here today to establish care with Neurology.  She has a history of migraine headaches since elementary school.  She feels these have worsened and was seen by her PCP and evaluated with an MRI and sent here for follow-up.  She had an MRI without contrast on 2023 with the impression of \"no acute intercranial process \"it did show 5 tiny foci of abnormal T2 flair within normal limits for age likely small vessel ischemic disease.  Patient reports average of 3 migraine days per month.  She has been prescribed Nurtec 75 mg as needed by her PCP and reports that the Nurtec is helping with her migraine within an hour.  Occasionally she does have some residual pain after her migraine and is lasting a day to a day and a half and she says she will repeat the Nurtec.  Her migraines are generally behind either eye and forehead region, occasionally the back of her head/neck.  She does report an aura where she sees floaters or \"squiggly lines \"as well as tunnel vision at times.  The symptoms cease after her pain starts.  She says she has associated photophobia and phonophobia along with nausea.  She has to go into a quiet dark room and she also says that smells can trigger also.  She does have to look at a computer during the day as she has  in a [employer] office.  She denies any weakness or numbness.  She had an eye exam about 6 months ago and her prescription lenses were changed at that time.  Denies any history of head injury.  She " has had a tubal ligation.  She has 5 children.        Pertinent Medical History: migraines, her father was diagnosed with super nuclear palsy    Subjective      Review of Systems:   Review of Systems   Eyes:  Positive for photophobia and visual disturbance.   Neurological:  Positive for headache.       Past Medical History:   Past Medical History:   Diagnosis Date    Headache     Scoliosis        Past Surgical History:   Past Surgical History:   Procedure Laterality Date    DIAGNOSTIC LAPAROSCOPY      for endometriosis    ENDOMETRIAL ABLATION      TUBAL ABDOMINAL LIGATION         Family History:   Family History   Problem Relation Age of Onset    Hypertension Father     Cancer Paternal Grandmother         non-hodgkins lymphoma    Cancer Paternal Grandfather         prostate    Breast cancer Neg Hx     Ovarian cancer Neg Hx        Social History:   Social History     Socioeconomic History    Marital status:    Tobacco Use    Smoking status: Former     Packs/day: 1.00     Years: 5.00     Additional pack years: 0.00     Total pack years: 5.00     Types: Cigarettes     Quit date: 2012     Years since quittin.9    Smokeless tobacco: Never   Vaping Use    Vaping Use: Some days    Substances: Nicotine    Devices: Pre-filled or refillable cartridge   Substance and Sexual Activity    Alcohol use: Yes     Alcohol/week: 2.0 standard drinks of alcohol     Types: 2 Glasses of wine per week    Drug use: No    Sexual activity: Yes     Partners: Male     Birth control/protection: Tubal ligation       Medications:     Current Outpatient Medications:     docusate sodium (COLACE) 250 MG capsule, Take 1 capsule by mouth 2 (Two) Times a Day As Needed for Constipation., Disp: 30 capsule, Rfl: 0    predniSONE 5 MG (48) tablet therapy pack dose pack, , Disp: , Rfl:     Rimegepant Sulfate (Nurtec) 75 MG tablet dispersible tablet, Take 1 tablet by mouth Every Other Day As Needed (migraine)., Disp: 8 tablet, Rfl: 5     "Dihydroergotamine Mesylate HFA (Trudhesa) 0.725 MG/ACT aerosol solution, 0.725 mg into the nostril(s) as directed by provider As Needed (HA)., Disp: 1 mL, Rfl: 0    Allergies:   Allergies   Allergen Reactions    Demerol [Meperidine] Nausea And Vomiting    Prednisone Swelling       Objective     Physical Exam:  Vital Signs:   Vitals:    11/28/23 0952   BP: 100/60   Pulse: 76   Temp: 98.4 °F (36.9 °C)   SpO2: 99%   Weight: 56.2 kg (124 lb)   Height: 165.1 cm (65\")   PainSc:   1   PainLoc: Head     Body mass index is 20.63 kg/m².     Physical Exam  Constitutional:       Appearance: Normal appearance.   HENT:      Head: Normocephalic.   Eyes:      Extraocular Movements: EOM normal.      Conjunctiva/sclera: Conjunctivae normal.      Pupils: Pupils are equal, round, and reactive to light.   Pulmonary:      Effort: Pulmonary effort is normal.   Musculoskeletal:         General: Normal range of motion.   Skin:     General: Skin is warm and dry.   Neurological:      General: No focal deficit present.      Mental Status: She is alert and oriented to person, place, and time.      Cranial Nerves: Cranial nerves 2-12 are intact. No dysarthria.      Motor: Motor strength is normal.Motor function is intact. No tremor or pronator drift.      Coordination: Coordination is intact. Romberg sign negative. Finger-Nose-Finger Test and Romberg Test normal. Rapid alternating movements normal.      Gait: Gait is intact. Tandem walk normal.      Deep Tendon Reflexes:      Reflex Scores:       Bicep reflexes are 2+ on the right side and 2+ on the left side.       Brachioradialis reflexes are 2+ on the right side and 2+ on the left side.       Patellar reflexes are 2+ on the right side and 2+ on the left side.  Psychiatric:         Attention and Perception: Attention normal.         Mood and Affect: Mood and affect normal.         Speech: Speech normal.         Behavior: Behavior normal. Behavior is cooperative.         Thought Content: " Thought content normal.         Cognition and Memory: Cognition normal.         Judgment: Judgment normal.         Neurologic Exam     Mental Status   Oriented to person, place, and time.   Speech: speech is normal   Level of consciousness: alert  Knowledge: good.     Cranial Nerves   Cranial nerves II through XII intact.     CN II   Visual fields full to confrontation.     CN III, IV, VI   Pupils are equal, round, and reactive to light.  Extraocular motions are normal.   CN III: no CN III palsy  CN VI: no CN VI palsy  Nystagmus: none     CN V   Facial sensation intact.     CN VIII   CN VIII normal.     CN IX, X   CN IX normal.   CN X normal.     CN XI   CN XI normal.     CN XII   CN XII normal.   Tongue: not atrophic  Fasciculations: absent  Tongue deviation: none    Motor Exam   Muscle bulk: normal  Overall muscle tone: normal  Right arm pronator drift: absent  Left arm pronator drift: absent    Strength   Strength 5/5 throughout.   Right deltoid: 5/5  Left deltoid: 5/5  Right biceps: 5/5  Left biceps: 5/5  Right triceps: 5/5  Left triceps: 5/5  Right quadriceps: 5/5  Left quadriceps: 5/5  Right hamstrin/5  Left hamstrin/5  Right anterior tibial: 5/5  Left anterior tibial: 5/5  Right posterior tibial: 5/5  Left posterior tibial: 5/5    Gait, Coordination, and Reflexes     Gait  Gait: normal    Coordination   Romberg: negative  Finger to nose coordination: normal  Tandem walking coordination: normal    Tremor   Resting tremor: absent  Intention tremor: absent  Action tremor: absent    Reflexes   Reflexes 2+ except as noted.   Right brachioradialis: 2+  Left brachioradialis: 2+  Right biceps: 2+  Left biceps: 2+  Right patellar: 2+  Left patellar: 2+      Assessment / Plan      Assessment/Plan:   Diagnoses and all orders for this visit:    1. Migraine with aura and without status migrainosus, not intractable (Primary)       She will continue with her current regimen of Nurtec as needed for her migraines.   Sample of Trudhesa has been given today as she does at times experience some more resistant migraines.  If she sees benefit from this we will call a prescription in for her.  Will plan on repeating MRI in 6 months as discussed with pt today based on MRI results.   Indications and side effects discussed with patient she verbalizes understanding.  Patient will call in the interim if she has any questions or concerns or changes.    Follow Up:   Return in about 5 months (around 4/28/2024).    ISABEL Eisenberg, FNP-[facility]

## 2023-12-04 ENCOUNTER — PATIENT ROUNDING (BHMG ONLY) (OUTPATIENT)
Dept: NEUROLOGY | Facility: CLINIC | Age: 43
End: 2023-12-04
Payer: COMMERCIAL

## 2024-01-16 ENCOUNTER — LAB (OUTPATIENT)
Dept: LAB | Facility: HOSPITAL | Age: 44
End: 2024-01-16
Payer: COMMERCIAL

## 2024-01-16 ENCOUNTER — OFFICE VISIT (OUTPATIENT)
Dept: NEUROLOGY | Facility: CLINIC | Age: 44
End: 2024-01-16
Payer: COMMERCIAL

## 2024-01-16 VITALS
HEART RATE: 62 BPM | TEMPERATURE: 98.6 F | DIASTOLIC BLOOD PRESSURE: 68 MMHG | SYSTOLIC BLOOD PRESSURE: 106 MMHG | WEIGHT: 129 LBS | BODY MASS INDEX: 21.49 KG/M2 | HEIGHT: 65 IN | OXYGEN SATURATION: 99 %

## 2024-01-16 DIAGNOSIS — G47.9 RESTLESS SLEEPER: ICD-10-CM

## 2024-01-16 DIAGNOSIS — R93.0 ABNORMAL MRI OF HEAD: ICD-10-CM

## 2024-01-16 DIAGNOSIS — R40.0 DAYTIME SLEEPINESS: Primary | ICD-10-CM

## 2024-01-16 DIAGNOSIS — G43.109 MIGRAINE WITH AURA AND WITHOUT STATUS MIGRAINOSUS, NOT INTRACTABLE: ICD-10-CM

## 2024-01-16 LAB
CRP SERPL-MCNC: <0.3 MG/DL (ref 0–0.5)
ERYTHROCYTE [SEDIMENTATION RATE] IN BLOOD: 3 MM/HR (ref 0–20)

## 2024-01-16 PROCEDURE — 36415 COLL VENOUS BLD VENIPUNCTURE: CPT

## 2024-01-16 PROCEDURE — 86140 C-REACTIVE PROTEIN: CPT

## 2024-01-16 PROCEDURE — 85652 RBC SED RATE AUTOMATED: CPT

## 2024-01-16 PROCEDURE — 86038 ANTINUCLEAR ANTIBODIES: CPT

## 2024-01-16 PROCEDURE — 99214 OFFICE O/P EST MOD 30 MIN: CPT | Performed by: NURSE PRACTITIONER

## 2024-01-16 RX ORDER — AMITRIPTYLINE HYDROCHLORIDE 10 MG/1
10 TABLET, FILM COATED ORAL NIGHTLY
Qty: 30 TABLET | Refills: 3 | Status: SHIPPED | OUTPATIENT
Start: 2024-01-16

## 2024-01-16 RX ORDER — TERBINAFINE HYDROCHLORIDE 250 MG/1
TABLET ORAL
COMMUNITY
Start: 2023-12-27

## 2024-01-16 NOTE — PROGRESS NOTES
"     New Patient Office Visit      Patient Name: Kate Mckeon  : 1980   MRN: [id number]     Referring Physician: No ref. provider found    Chief Complaint:    Chief Complaint   Patient presents with    Migraine     Patient reports  migraines; symptoms have changed; blurred vision, floaters and non reactive pupils; patient was seen by Optometrist and diagnosed with Ocular Migraine       History of Present Illness: Kate Mckeon is a 43 y.o. female who is here today to follow up with migraine HA's. She says she has been having more frequent migraines and feels they are different. She had recent eval at eye doctor and says she was told she is having \"ocular migraines\" and says she was told her retinas are thinning and not sure why. She says she had 6 migraines this past month and says they are over both eyes and  and forehead region and has associated ear pain. These awaken her at night. She says she still at times has an aura with the more typical migraines she is used to.  She does note that she still gets tunnel vision at times and sees floaters.  She has few migraines that do not wake her up at night.  She did not have any benefit with triptans or over-the-counter medications.  Reports she does not feel that she is under stress and does not feel that this is a factor.  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  She denies any weakness, tingling or numbness.  Denies any fatigue other than daytime drowsiness. Reports she does not sleep well at night.  She denies any muscle or joint pain.  She is concerned because her father has supranuclear palsy.          Pertinent Medical History: migraines, her father was diagnosed with supranuclear palsy    Denies any history of head injury.  She has had a tubal ligation.  She has 5 children.  Subjective      Review of Systems:   Review of Systems   Eyes:  Positive for photophobia and visual " disturbance.   Neurological:  Positive for headache.   Psychiatric/Behavioral:  Positive for sleep disturbance.        Past Medical History:   Past Medical History:   Diagnosis Date    Headache     Scoliosis        Past Surgical History:   Past Surgical History:   Procedure Laterality Date    DIAGNOSTIC LAPAROSCOPY      for endometriosis    ENDOMETRIAL ABLATION      TUBAL ABDOMINAL LIGATION         Family History:   Family History   Problem Relation Age of Onset    Hypertension Father     Cancer Paternal Grandmother         non-hodgkins lymphoma    Cancer Paternal Grandfather         prostate    Breast cancer Neg Hx     Ovarian cancer Neg Hx        Social History:   Social History     Socioeconomic History    Marital status:    Tobacco Use    Smoking status: Former     Packs/day: 1.00     Years: 5.00     Additional pack years: 0.00     Total pack years: 5.00     Types: Cigarettes     Quit date: 2012     Years since quittin.0    Smokeless tobacco: Never   Vaping Use    Vaping Use: Some days    Substances: Nicotine    Devices: Pre-filled or refillable cartridge   Substance and Sexual Activity    Alcohol use: Yes     Alcohol/week: 2.0 standard drinks of alcohol     Types: 2 Glasses of wine per week    Drug use: No    Sexual activity: Yes     Partners: Male     Birth control/protection: Tubal ligation       Medications:     Current Outpatient Medications:     Dihydroergotamine Mesylate HFA (Trudhesa) 0.725 MG/ACT aerosol solution, 0.725 mg into the nostril(s) as directed by provider As Needed (HA)., Disp: 1 mL, Rfl: 0    docusate sodium (COLACE) 250 MG capsule, Take 1 capsule by mouth 2 (Two) Times a Day As Needed for Constipation., Disp: 30 capsule, Rfl: 0    Rimegepant Sulfate (Nurtec) 75 MG tablet dispersible tablet, Take 1 tablet by mouth Every Other Day As Needed (migraine)., Disp: 8 tablet, Rfl: 5    terbinafine (lamiSIL) 250 MG tablet, , Disp: , Rfl:     amitriptyline (ELAVIL) 10 MG tablet, Take 1  "tablet by mouth Every Night., Disp: 30 tablet, Rfl: 3    Allergies:   Allergies   Allergen Reactions    Demerol [Meperidine] Nausea And Vomiting    Prednisone Swelling       Objective     Physical Exam:  Vital Signs:   Vitals:    01/16/24 0946   BP: 106/68   Pulse: 62   Temp: 98.6 °F (37 °C)   SpO2: 99%   Weight: 58.5 kg (129 lb)   Height: 165.1 cm (65\")   PainSc: 0-No pain     Body mass index is 21.47 kg/m².     Physical Exam  Constitutional:       Appearance: Normal appearance.   HENT:      Head: Normocephalic.   Eyes:      Conjunctiva/sclera: Conjunctivae normal.   Pulmonary:      Effort: Pulmonary effort is normal.   Musculoskeletal:         General: Normal range of motion.   Skin:     General: Skin is warm and dry.   Neurological:      General: No focal deficit present.      Mental Status: She is alert and oriented to person, place, and time.      Cranial Nerves: Cranial nerves 2-12 are intact. No dysarthria.      Motor: Motor function is intact.      Coordination: Coordination is intact.      Gait: Gait is intact.   Psychiatric:         Attention and Perception: Attention normal.         Mood and Affect: Mood and affect normal.         Speech: Speech normal.         Behavior: Behavior normal. Behavior is cooperative.         Thought Content: Thought content normal.         Cognition and Memory: Cognition normal.         Judgment: Judgment normal.         Neurologic Exam     Mental Status   Oriented to person, place, and time.   Speech: speech is normal   Level of consciousness: alert  Knowledge: good.     Cranial Nerves   Cranial nerves II through XII intact.     Gait, Coordination, and Reflexes     Gait  Gait: normal      Assessment / Plan      Assessment/Plan:   Diagnoses and all orders for this visit:    1. Daytime sleepiness (Primary)  -     Home Sleep Study; Future    2. Migraine with aura and without status migrainosus, not intractable  -     Home Sleep Study; Future  -     amitriptyline (ELAVIL) 10 MG " tablet; Take 1 tablet by mouth Every Night.  Dispense: 30 tablet; Refill: 3    3. Restless sleeper  -     Home Sleep Study; Future    4. Abnormal MRI of head  -     ARCHANA by IFA, Reflex 9-biomarkers profile; Future  -     Sedimentation Rate; Future  -     C-reactive Protein; Future         She will continue with her current regimen of Nurtec as needed for her migraines.  Will add amitriptyline 10 mg at night for prevention.  Indications and side effects discussed with patient she verbalizes understanding.   Discussed with patient concern for obstructive sleep apnea due to her waking up with migraines and this tends to be the only time of day that she has been getting them.  Home sleep study has been ordered.  Mallampati score 2.  Dawson sleepiness score was 10.  Will plan on repeating MRI in 6 months as discussed with pt today based on MRI results.  Will add on labs today to look for any autoimmune causes for her abnormal MRI.   Patient will call in the interim if she has any questions or concerns or changes.    Follow Up:   Return in about 2 months (around 3/16/2024).    ISABEL Eisenberg, FNP-[facility]rology and Sleep Medicine

## 2024-01-18 LAB
ANA SER QL IF: NEGATIVE
LABORATORY COMMENT REPORT: NORMAL

## 2024-03-14 DIAGNOSIS — G43.109 MIGRAINE WITH AURA AND WITHOUT STATUS MIGRAINOSUS, NOT INTRACTABLE: ICD-10-CM

## 2024-03-14 RX ORDER — AMITRIPTYLINE HYDROCHLORIDE 10 MG/1
10 TABLET, FILM COATED ORAL NIGHTLY
Qty: 90 TABLET | Refills: 0 | Status: SHIPPED | OUTPATIENT
Start: 2024-03-14

## 2024-04-29 ENCOUNTER — OFFICE VISIT (OUTPATIENT)
Dept: NEUROLOGY | Facility: CLINIC | Age: 44
End: 2024-04-29
Payer: MEDICAID

## 2024-04-29 VITALS
SYSTOLIC BLOOD PRESSURE: 104 MMHG | DIASTOLIC BLOOD PRESSURE: 60 MMHG | OXYGEN SATURATION: 96 % | BODY MASS INDEX: 21.49 KG/M2 | HEART RATE: 60 BPM | WEIGHT: 129 LBS | HEIGHT: 65 IN | TEMPERATURE: 98.7 F

## 2024-04-29 DIAGNOSIS — G43.109 MIGRAINE WITH AURA AND WITHOUT STATUS MIGRAINOSUS, NOT INTRACTABLE: ICD-10-CM

## 2024-04-29 PROCEDURE — 1159F MED LIST DOCD IN RCRD: CPT | Performed by: NURSE PRACTITIONER

## 2024-04-29 PROCEDURE — 1160F RVW MEDS BY RX/DR IN RCRD: CPT | Performed by: NURSE PRACTITIONER

## 2024-04-29 PROCEDURE — 99213 OFFICE O/P EST LOW 20 MIN: CPT | Performed by: NURSE PRACTITIONER

## 2024-04-29 RX ORDER — RIMEGEPANT SULFATE 75 MG/75MG
75 TABLET, ORALLY DISINTEGRATING ORAL
Qty: 8 TABLET | Refills: 5 | Status: SHIPPED | OUTPATIENT
Start: 2024-04-29

## 2024-04-29 NOTE — PROGRESS NOTES
"     Follow Up Office Visit      Patient Name: Kate Mckeon  : 1980   MRN: [id number]     Chief Complaint:    Chief Complaint   Patient presents with    Follow-up     Migraine; patient reports  migraines; patient reports that symptoms have decreased with nurtec       History of Present Illness: Kate Mckeon is a 43 y.o. female who is here today to follow up with migraines. She says the migraines have been starting in her eyes and radiate to her forehead. She says the pain is pulsating. The Nurtec will help within an hour. She says she has been waking up with headaches.  She had a home sleep study ordered but she has not had done \"they never called me\". She says she has not been having the tunnel vision or seeing floaters as she had been.  She is happy with her as needed Nurtec.  She says she has started a different job and feels it is not as stressful.  -Labs from 2024 showed negative ARCHANA, sed rate and CRP within normal limits.  -MRI of the brain with and without contrast on 2023 showed no acute intracranial abnormality.  It did note 5 tiny foci within normal limits for age.  Will repeat this in 1 year          History of Present Illness has been carried forward from her 2024 office note as follows: Kate Mckeon is a 43 y.o. female who is here today to follow up with migraine HA's. She says she has been having more frequent migraines and feels they are different. She had recent eval at eye doctor and says she was told she is having \"ocular migraines\" and says she was told her retinas are thinning and not sure why. She says she had 6 migraines this past month and says they are over both eyes and  and forehead region and has associated ear pain. These awaken her at night. She says she still at times has an aura with the more typical migraines she is used to.  She does note that she still gets tunnel vision at times and sees floaters.  She has few migraines that do not wake " her up at night.  She did not have any benefit with triptans or over-the-counter medications.  Reports she does not feel that she is under stress and does not feel that this is a factor.  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  She denies any weakness, tingling or numbness.  Denies any fatigue other than daytime drowsiness. Reports she does not sleep well at night.  She denies any muscle or joint pain.  She is concerned because her father has supranuclear palsy.          Pertinent Medical History: migraines, her father was diagnosed with supranuclear palsy    Denies any history of head injury.  She has had a tubal ligation.  She has 5 children.    Subjective      Review of Systems:   Review of Systems   Eyes:  Positive for photophobia.   Neurological:  Positive for headache.       I have reviewed and the following portions of the patient's history were updated as appropriate: past family history, past medical history, past social history, past surgical history and problem list.    Medications:     Current Outpatient Medications:     amitriptyline (ELAVIL) 10 MG tablet, Take 1 tablet by mouth Every Night., Disp: 90 tablet, Rfl: 0    Dihydroergotamine Mesylate HFA (Trudhesa) 0.725 MG/ACT aerosol solution, 0.725 mg into the nostril(s) as directed by provider As Needed (HA)., Disp: 1 mL, Rfl: 0    docusate sodium (COLACE) 250 MG capsule, Take 1 capsule by mouth 2 (Two) Times a Day As Needed for Constipation., Disp: 30 capsule, Rfl: 0    Rimegepant Sulfate (Nurtec) 75 MG tablet dispersible tablet, Take 1 tablet by mouth Every Other Day As Needed (migraine)., Disp: 8 tablet, Rfl: 5    Allergies:   Allergies   Allergen Reactions    Demerol [Meperidine] Nausea And Vomiting    Prednisone Swelling       Objective     Physical Exam:  Vital Signs:   Vitals:    04/29/24 0906   BP: 104/60   Pulse: 60   Temp: 98.7 °F (37.1 °C)   SpO2: 96%   Weight: 58.5 kg (129 lb)  "  Height: 165.1 cm (65\")   PainSc: 0-No pain     Body mass index is 21.47 kg/m².    Physical Exam  Constitutional:       Appearance: Normal appearance.   HENT:      Head: Normocephalic.   Eyes:      Conjunctiva/sclera: Conjunctivae normal.   Pulmonary:      Effort: Pulmonary effort is normal.   Musculoskeletal:         General: Normal range of motion.   Skin:     General: Skin is warm and dry.   Neurological:      General: No focal deficit present.      Mental Status: She is alert and oriented to person, place, and time.      Cranial Nerves: Cranial nerves 2-12 are intact. No dysarthria.      Motor: Motor function is intact. No tremor.      Coordination: Coordination is intact. Finger-Nose-Finger Test normal.      Gait: Gait is intact.   Psychiatric:         Attention and Perception: Attention normal.         Mood and Affect: Mood and affect normal.         Speech: Speech normal.         Behavior: Behavior normal. Behavior is cooperative.         Thought Content: Thought content normal.         Cognition and Memory: Cognition normal.         Judgment: Judgment normal.         Neurologic Exam     Mental Status   Oriented to person, place, and time.   Attention: normal. Concentration: normal.   Speech: speech is normal   Level of consciousness: alert  Knowledge: good.     Cranial Nerves   Cranial nerves II through XII intact.     CN VII   Facial expression full, symmetric.   Right facial weakness: none  Left facial weakness: none    CN IX, X   Palate: symmetric    CN XII   Tongue: not atrophic  Fasciculations: absent  Tongue deviation: none    Motor Exam   Muscle bulk: normal  Overall muscle tone: normal    Strength   Right deltoid: 5/5  Left deltoid: 5/5  Right biceps: 5/5  Left biceps: 5/5  Right triceps: 5/5  Left triceps: 5/5  Right quadriceps: 5/5  Left quadriceps: 5/5  Right hamstrin/5  Left hamstrin/5  Right anterior tibial: 5/5  Left anterior tibial: 5/5  Right posterior tibial: 5/5  Left posterior " tibial: 5/5    Gait, Coordination, and Reflexes     Gait  Gait: normal    Coordination   Finger to nose coordination: normal    Tremor   Resting tremor: absent  Action tremor: absent       Assessment / Plan      Assessment/Plan:   Diagnoses and all orders for this visit:    1. Migraine with aura and without status migrainosus, not intractable  -     Rimegepant Sulfate (Nurtec) 75 MG tablet dispersible tablet; Take 1 tablet by mouth Every Other Day As Needed (migraine).  Dispense: 8 tablet; Refill: 5       This is a pleasant 43-year-old female patient here to follow-up on migraine headaches.  At last office visit in January she reported 6 migraine days and today reports 4 migraine days per month.  She will call and get her home sleep study scheduled as they had attempted to notify her but were unable to reach her.  Patient was given the phone number today.  Will continue as needed Nurtec without change.  Indications and side effects discussed with patient she verbalizes understanding.  Patient will call in the interim if she has any questions or concerns or changes.    Follow Up:   Return in about 6 months (around 10/29/2024).    ISABEL Eisenberg, FNP-BC  [facility]

## 2024-05-22 ENCOUNTER — HOSPITAL ENCOUNTER (OUTPATIENT)
Dept: SLEEP MEDICINE | Facility: HOSPITAL | Age: 44
Discharge: HOME | End: 2024-05-22
Admitting: NURSE PRACTITIONER
Payer: COMMERCIAL

## 2024-05-22 DIAGNOSIS — G47.9 RESTLESS SLEEPER: ICD-10-CM

## 2024-05-22 DIAGNOSIS — R40.0 DAYTIME SLEEPINESS: ICD-10-CM

## 2024-05-22 DIAGNOSIS — G43.109 MIGRAINE WITH AURA AND WITHOUT STATUS MIGRAINOSUS, NOT INTRACTABLE: ICD-10-CM

## 2024-05-22 PROCEDURE — 95806 SLEEP STUDY UNATT&RESP EFFT: CPT

## 2024-05-23 PROCEDURE — 95806 SLEEP STUDY UNATT&RESP EFFT: CPT | Performed by: INTERNAL MEDICINE

## 2024-05-24 DIAGNOSIS — G43.109 MIGRAINE WITH AURA AND WITHOUT STATUS MIGRAINOSUS, NOT INTRACTABLE: ICD-10-CM

## 2024-05-28 RX ORDER — AMITRIPTYLINE HYDROCHLORIDE 10 MG/1
10 TABLET, FILM COATED ORAL NIGHTLY
Qty: 30 TABLET | Refills: 2 | Status: SHIPPED | OUTPATIENT
Start: 2024-05-28

## 2024-05-31 ENCOUNTER — OFFICE VISIT (OUTPATIENT)
Dept: FAMILY MEDICINE CLINIC | Facility: CLINIC | Age: 44
End: 2024-05-31
Payer: COMMERCIAL

## 2024-05-31 VITALS
BODY MASS INDEX: 20.83 KG/M2 | DIASTOLIC BLOOD PRESSURE: 82 MMHG | WEIGHT: 129.6 LBS | OXYGEN SATURATION: 99 % | HEART RATE: 81 BPM | HEIGHT: 66 IN | SYSTOLIC BLOOD PRESSURE: 106 MMHG

## 2024-05-31 DIAGNOSIS — R23.2 HOT FLASHES: ICD-10-CM

## 2024-05-31 DIAGNOSIS — Z13.1 SCREENING FOR DIABETES MELLITUS: ICD-10-CM

## 2024-05-31 DIAGNOSIS — R61 NIGHT SWEAT: ICD-10-CM

## 2024-05-31 DIAGNOSIS — Z00.00 ANNUAL PHYSICAL EXAM: Primary | ICD-10-CM

## 2024-05-31 PROCEDURE — 99396 PREV VISIT EST AGE 40-64: CPT | Performed by: NURSE PRACTITIONER

## 2024-05-31 NOTE — PROGRESS NOTES
"      Subjective     Chief Complaint:    Chief Complaint   Patient presents with    Annual Exam     Pt states she wants to talk about her weight gain.  Pt states she is dealing with night sweats and hot flashes.        History of Present Illness:   Here for annual exam  Hx of endometrosis with 2 laps  Last pap a few years ago with Dr. Booker, is due for pap  She is having more night sweats, she also notes hot flashes   Migraines, follows with neuro, on elavil and nurtec  She has taken imitrex in the past and it made her very nauseated and did not help.   Periods are at times heavy at times  Walking for exercise, wide variety diet  Wears seat belt  Eye exam UTD  Dental exam UTD   Mammo 7/23, does regular self breast exam       Review of Systems  Gen- No fevers, chills  CV- No chest pain, palpitations  Resp- No cough, dyspnea  GI- No N/V/D, abd pain  Neuro-No dizziness, headaches      I have reviewed and/or updated the patient's past medical, surgical, family, social history and problem list as appropriate.     Medications:    Current Outpatient Medications:     amitriptyline (ELAVIL) 10 MG tablet, TAKE ONE TABLET BY MOUTH ONCE NIGHTLY, Disp: 30 tablet, Rfl: 2    Dihydroergotamine Mesylate HFA (Trudhesa) 0.725 MG/ACT aerosol solution, 0.725 mg into the nostril(s) as directed by provider As Needed (HA)., Disp: 1 mL, Rfl: 0    docusate sodium (COLACE) 250 MG capsule, Take 1 capsule by mouth 2 (Two) Times a Day As Needed for Constipation., Disp: 30 capsule, Rfl: 0    Rimegepant Sulfate (Nurtec) 75 MG tablet dispersible tablet, Take 1 tablet by mouth Every Other Day As Needed (migraine)., Disp: 8 tablet, Rfl: 5    Allergies:  Allergies   Allergen Reactions    Demerol [Meperidine] Nausea And Vomiting    Prednisone Swelling       Objective     Vital Signs:   Vitals:    05/31/24 1456   BP: 106/82   Pulse: 81   SpO2: 99%   Weight: 58.8 kg (129 lb 9.6 oz)   Height: 166.4 cm (65.5\")     Body mass index is 21.24 " kg/m².    Physical Exam:    Physical Exam  Constitutional:       Appearance: Normal appearance. She is well-developed.   HENT:      Head: Normocephalic and atraumatic.      Right Ear: Tympanic membrane, ear canal and external ear normal.      Left Ear: Tympanic membrane, ear canal and external ear normal.      Nose: Nose normal.      Mouth/Throat:      Pharynx: Uvula midline.   Eyes:      Pupils: Pupils are equal, round, and reactive to light.   Cardiovascular:      Rate and Rhythm: Normal rate and regular rhythm.      Heart sounds: Normal heart sounds. No murmur heard.     No friction rub. No gallop.   Pulmonary:      Effort: Pulmonary effort is normal.      Breath sounds: Normal breath sounds.   Abdominal:      General: Bowel sounds are normal.      Palpations: Abdomen is soft.      Tenderness: There is no abdominal tenderness.   Musculoskeletal:      Cervical back: Neck supple.   Lymphadenopathy:      Head:      Right side of head: No submental, submandibular, tonsillar, preauricular or posterior auricular adenopathy.      Left side of head: No submental, submandibular, tonsillar, preauricular or posterior auricular adenopathy.      Cervical: No cervical adenopathy.   Skin:     General: Skin is warm and dry.   Neurological:      General: No focal deficit present.      Mental Status: She is alert and oriented to person, place, and time.   Psychiatric:         Mood and Affect: Mood normal.         Behavior: Behavior normal.         Assessment / Plan     Assessment/Plan:   Problem List Items Addressed This Visit    None  Visit Diagnoses       Annual physical exam    -  Primary    Relevant Orders    Comprehensive Metabolic Panel    CBC Auto Differential    TSH Rfx On Abnormal To Free T4    Screening for diabetes mellitus        Relevant Orders    Hemoglobin A1c    Hot flashes        Relevant Orders    TSH Rfx On Abnormal To Free T4    Hemoglobin A1c    Night sweat        Relevant Orders    TSH Rfx On Abnormal To Free  T4    Hemoglobin A1c            -- physical performed today, encouraged clean eating, whole foods, limit processed and sugary foods, exercise 150min/week as tolerated  -- momma next year  -- labs    Discussed plan of care in detail with pt today; pt verb understanding and agrees.    Follow up:  8 weeks, pap    Electronically signed by ISABEL Stevenson       Please note that portions of this note were completed with a voice recognition program.

## 2024-06-01 LAB
ALBUMIN SERPL-MCNC: 4 G/DL (ref 3.9–4.9)
ALBUMIN/GLOB SERPL: 1.7 {RATIO} (ref 1.2–2.2)
ALP SERPL-CCNC: 89 IU/L (ref 44–121)
ALT SERPL-CCNC: 12 IU/L (ref 0–32)
AST SERPL-CCNC: 15 IU/L (ref 0–40)
BASOPHILS # BLD AUTO: 0.1 X10E3/UL (ref 0–0.2)
BASOPHILS NFR BLD AUTO: 1 %
BILIRUB SERPL-MCNC: <0.2 MG/DL (ref 0–1.2)
BUN SERPL-MCNC: 14 MG/DL (ref 6–24)
BUN/CREAT SERPL: 18 (ref 9–23)
CALCIUM SERPL-MCNC: 8.9 MG/DL (ref 8.7–10.2)
CHLORIDE SERPL-SCNC: 102 MMOL/L (ref 96–106)
CO2 SERPL-SCNC: 24 MMOL/L (ref 20–29)
CREAT SERPL-MCNC: 0.77 MG/DL (ref 0.57–1)
EGFRCR SERPLBLD CKD-EPI 2021: 98 ML/MIN/1.73
EOSINOPHIL # BLD AUTO: 0.3 X10E3/UL (ref 0–0.4)
EOSINOPHIL NFR BLD AUTO: 4 %
ERYTHROCYTE [DISTWIDTH] IN BLOOD BY AUTOMATED COUNT: 12.4 % (ref 11.7–15.4)
GLOBULIN SER CALC-MCNC: 2.4 G/DL (ref 1.5–4.5)
GLUCOSE SERPL-MCNC: 83 MG/DL (ref 70–99)
HBA1C MFR BLD: 5.3 % (ref 4.8–5.6)
HCT VFR BLD AUTO: 42.2 % (ref 34–46.6)
HGB BLD-MCNC: 13.7 G/DL (ref 11.1–15.9)
IMM GRANULOCYTES # BLD AUTO: 0 X10E3/UL (ref 0–0.1)
IMM GRANULOCYTES NFR BLD AUTO: 0 %
LYMPHOCYTES # BLD AUTO: 1.9 X10E3/UL (ref 0.7–3.1)
LYMPHOCYTES NFR BLD AUTO: 24 %
MCH RBC QN AUTO: 29.8 PG (ref 26.6–33)
MCHC RBC AUTO-ENTMCNC: 32.5 G/DL (ref 31.5–35.7)
MCV RBC AUTO: 92 FL (ref 79–97)
MONOCYTES # BLD AUTO: 0.7 X10E3/UL (ref 0.1–0.9)
MONOCYTES NFR BLD AUTO: 9 %
NEUTROPHILS # BLD AUTO: 5 X10E3/UL (ref 1.4–7)
NEUTROPHILS NFR BLD AUTO: 62 %
PLATELET # BLD AUTO: 298 X10E3/UL (ref 150–450)
POTASSIUM SERPL-SCNC: 4.2 MMOL/L (ref 3.5–5.2)
PROT SERPL-MCNC: 6.4 G/DL (ref 6–8.5)
RBC # BLD AUTO: 4.6 X10E6/UL (ref 3.77–5.28)
SODIUM SERPL-SCNC: 138 MMOL/L (ref 134–144)
TSH SERPL DL<=0.005 MIU/L-ACNC: 0.64 UIU/ML (ref 0.45–4.5)
WBC # BLD AUTO: 8 X10E3/UL (ref 3.4–10.8)

## 2024-06-08 DIAGNOSIS — G43.109 MIGRAINE WITH AURA AND WITHOUT STATUS MIGRAINOSUS, NOT INTRACTABLE: ICD-10-CM

## 2024-06-10 RX ORDER — AMITRIPTYLINE HYDROCHLORIDE 10 MG/1
10 TABLET, FILM COATED ORAL NIGHTLY
Qty: 90 TABLET | Refills: 0 | Status: SHIPPED | OUTPATIENT
Start: 2024-06-10

## 2024-06-28 ENCOUNTER — OFFICE VISIT (OUTPATIENT)
Dept: FAMILY MEDICINE CLINIC | Facility: CLINIC | Age: 44
End: 2024-06-28
Payer: COMMERCIAL

## 2024-06-28 VITALS
OXYGEN SATURATION: 98 % | HEART RATE: 82 BPM | DIASTOLIC BLOOD PRESSURE: 68 MMHG | HEIGHT: 66 IN | SYSTOLIC BLOOD PRESSURE: 110 MMHG | WEIGHT: 128 LBS | BODY MASS INDEX: 20.57 KG/M2

## 2024-06-28 DIAGNOSIS — Z00.00 ANNUAL PHYSICAL EXAM: Primary | ICD-10-CM

## 2024-06-28 PROCEDURE — 99396 PREV VISIT EST AGE 40-64: CPT | Performed by: NURSE PRACTITIONER

## 2024-06-28 NOTE — PROGRESS NOTES
"       Annual Well Woman Exam     Referring Physician: No ref. provider found    Subjective     Chief Complaint:    Chief Complaint   Patient presents with    Gynecologic Exam     Annual Pap       History of Present Illness:    Kate Mckeon is a 43 y.o. female who presents for annual GYN exam.  Hx of endometrosis with 2 laps  Last pap a few years ago with Dr. Booker  She is having more night sweats, she also notes hot flashes         Review of Systems  Gen- No fevers, chills  CV- No chest pain, palpitations  Resp- No cough, dyspnea  GI- No N/V/D, abd pain  Neuro-No dizziness, headaches    Obstetric History:  OB History    No obstetric history on file.        Menstrual History:     No LMP recorded.       Sexual History:         Current contraception: tubal ligation  History of abnormal Pap smear: no    Perform regular self breast exam: yes - monthly  Family history of uterine or ovarian cancer: no  Family History of cervical cancer: no  Family History of colon cancer/colon polyps: no  History of abnormal mammogram: no  Family history of breast cancer: no      The following portions of the patient's history were reviewed and updated as appropriate: allergies, current medications, past family history, past medical history, past social history, past surgical history and problem list.    Objective     Vital Signs:   Vitals:    06/28/24 1419   BP: 110/68   Pulse: 82   SpO2: 98%   Weight: 58.1 kg (128 lb)   Height: 166.4 cm (65.51\")       Physical Exam:    Physical Exam  Vitals and nursing note reviewed. Exam conducted with a chaperone present.   Constitutional:       Appearance: She is well-developed.   HENT:      Head: Normocephalic and atraumatic.   Eyes:      Pupils: Pupils are equal, round, and reactive to light.   Cardiovascular:      Rate and Rhythm: Normal rate and regular rhythm.      Heart sounds: Normal heart sounds.   Pulmonary:      Effort: Pulmonary effort is normal.      Breath sounds: Normal breath " sounds.   Abdominal:      General: Bowel sounds are normal. There is no distension.      Palpations: Abdomen is soft.      Tenderness: There is no abdominal tenderness.   Genitourinary:     Labia:         Right: No rash.       Vagina: Normal.      Cervix: Normal.      Uterus: Normal, absent.       Adnexa: Right adnexa normal and left adnexa normal.   Musculoskeletal:      Cervical back: Neck supple.   Lymphadenopathy:      Lower Body: No right inguinal adenopathy. No left inguinal adenopathy.   Skin:     General: Skin is warm and dry.   Neurological:      General: No focal deficit present.      Mental Status: She is alert and oriented to person, place, and time.   Psychiatric:         Mood and Affect: Mood normal.         Behavior: Behavior normal.       Assessment / Plan     Assessment/Plan:   Problem List Items Addressed This Visit    None  Visit Diagnoses       Annual physical exam    -  Primary    Relevant Orders    LIQUID-BASED PAP SMEAR WITH HPV GENOTYPING REGARDLESS OF INTERPRETATION (SIVAKUMAR,COR,MAD) (Completed)            -- pap performed, recommend clean eating and exercise with goal of 150min/week of physical activity, encourage yearly eye exam    Discussed plan of care in detail with pt today; pt verb understanding and agrees.    Follow up:          Electronically signed by ISABEL Stevenson   06/28/2024 14:30 EDT

## 2024-07-09 ENCOUNTER — APPOINTMENT (OUTPATIENT)
Dept: CT IMAGING | Facility: HOSPITAL | Age: 44
End: 2024-07-09
Payer: COMMERCIAL

## 2024-07-09 ENCOUNTER — HOSPITAL ENCOUNTER (EMERGENCY)
Facility: HOSPITAL | Age: 44
Discharge: HOME | End: 2024-07-09
Attending: EMERGENCY MEDICINE
Payer: COMMERCIAL

## 2024-07-09 VITALS
HEART RATE: 78 BPM | RESPIRATION RATE: 18 BRPM | HEIGHT: 66 IN | DIASTOLIC BLOOD PRESSURE: 57 MMHG | WEIGHT: 128 LBS | SYSTOLIC BLOOD PRESSURE: 87 MMHG | OXYGEN SATURATION: 97 % | TEMPERATURE: 98.7 F | BODY MASS INDEX: 20.57 KG/M2

## 2024-07-09 DIAGNOSIS — B34.9 VIRAL SYNDROME: Primary | ICD-10-CM

## 2024-07-09 LAB
ALBUMIN SERPL-MCNC: 3.8 G/DL (ref 3.5–5.2)
ALBUMIN/GLOB SERPL: 1.3 G/DL
ALP SERPL-CCNC: 104 U/L (ref 39–117)
ALT SERPL W P-5'-P-CCNC: 10 U/L (ref 1–33)
ANION GAP SERPL CALCULATED.3IONS-SCNC: 12.7 MMOL/L (ref 5–15)
AST SERPL-CCNC: 15 U/L (ref 1–32)
B-HCG UR QL: NEGATIVE
BACTERIA UR QL AUTO: ABNORMAL /HPF
BASOPHILS # BLD AUTO: 0.02 10*3/MM3 (ref 0–0.2)
BASOPHILS NFR BLD AUTO: 0.2 % (ref 0–1.5)
BILIRUB SERPL-MCNC: 0.5 MG/DL (ref 0–1.2)
BILIRUB UR QL STRIP: NEGATIVE
BUN SERPL-MCNC: 16 MG/DL (ref 6–20)
BUN/CREAT SERPL: 23.2 (ref 7–25)
CALCIUM SPEC-SCNC: 8.8 MG/DL (ref 8.6–10.5)
CHLORIDE SERPL-SCNC: 97 MMOL/L (ref 98–107)
CK SERPL-CCNC: 59 U/L (ref 20–180)
CLARITY UR: ABNORMAL
CO2 SERPL-SCNC: 23.3 MMOL/L (ref 22–29)
COLOR UR: YELLOW
CREAT SERPL-MCNC: 0.69 MG/DL (ref 0.57–1)
DEPRECATED RDW RBC AUTO: 41.9 FL (ref 37–54)
EGFRCR SERPLBLD CKD-EPI 2021: 110.6 ML/MIN/1.73
EOSINOPHIL # BLD AUTO: 0.01 10*3/MM3 (ref 0–0.4)
EOSINOPHIL NFR BLD AUTO: 0.1 % (ref 0.3–6.2)
ERYTHROCYTE [DISTWIDTH] IN BLOOD BY AUTOMATED COUNT: 13 % (ref 12.3–15.4)
FLUAV RNA RESP QL NAA+PROBE: NOT DETECTED
FLUBV RNA RESP QL NAA+PROBE: NOT DETECTED
GLOBULIN UR ELPH-MCNC: 3 GM/DL
GLUCOSE SERPL-MCNC: 107 MG/DL (ref 65–99)
GLUCOSE UR STRIP-MCNC: NEGATIVE MG/DL
HCT VFR BLD AUTO: 40.9 % (ref 34–46.6)
HGB BLD-MCNC: 13.8 G/DL (ref 12–15.9)
HGB UR QL STRIP.AUTO: NEGATIVE
HOLD SPECIMEN: NORMAL
HOLD SPECIMEN: NORMAL
HYALINE CASTS UR QL AUTO: ABNORMAL /LPF
IMM GRANULOCYTES # BLD AUTO: 0.04 10*3/MM3 (ref 0–0.05)
IMM GRANULOCYTES NFR BLD AUTO: 0.4 % (ref 0–0.5)
KETONES UR QL STRIP: ABNORMAL
LEUKOCYTE ESTERASE UR QL STRIP.AUTO: NEGATIVE
LIPASE SERPL-CCNC: 12 U/L (ref 13–60)
LYMPHOCYTES # BLD AUTO: 0.67 10*3/MM3 (ref 0.7–3.1)
LYMPHOCYTES NFR BLD AUTO: 6.9 % (ref 19.6–45.3)
MCH RBC QN AUTO: 29.8 PG (ref 26.6–33)
MCHC RBC AUTO-ENTMCNC: 33.7 G/DL (ref 31.5–35.7)
MCV RBC AUTO: 88.3 FL (ref 79–97)
MONOCYTES # BLD AUTO: 0.78 10*3/MM3 (ref 0.1–0.9)
MONOCYTES NFR BLD AUTO: 8 % (ref 5–12)
MUCOUS THREADS URNS QL MICRO: ABNORMAL /HPF
NEUTROPHILS NFR BLD AUTO: 8.17 10*3/MM3 (ref 1.7–7)
NEUTROPHILS NFR BLD AUTO: 84.4 % (ref 42.7–76)
NITRITE UR QL STRIP: NEGATIVE
NRBC BLD AUTO-RTO: 0 /100 WBC (ref 0–0.2)
PH UR STRIP.AUTO: 6 [PH] (ref 5–8)
PLATELET # BLD AUTO: 221 10*3/MM3 (ref 140–450)
PMV BLD AUTO: 9.5 FL (ref 6–12)
POTASSIUM SERPL-SCNC: 4.1 MMOL/L (ref 3.5–5.2)
PROT SERPL-MCNC: 6.8 G/DL (ref 6–8.5)
PROT UR QL STRIP: ABNORMAL
RBC # BLD AUTO: 4.63 10*6/MM3 (ref 3.77–5.28)
RBC # UR STRIP: ABNORMAL /HPF
REF LAB TEST METHOD: ABNORMAL
RSV RNA RESP QL NAA+PROBE: NOT DETECTED
SARS-COV-2 RNA RESP QL NAA+PROBE: NOT DETECTED
SODIUM SERPL-SCNC: 133 MMOL/L (ref 136–145)
SP GR UR STRIP: >=1.03 (ref 1–1.03)
SQUAMOUS #/AREA URNS HPF: ABNORMAL /HPF
UROBILINOGEN UR QL STRIP: ABNORMAL
WBC # UR STRIP: ABNORMAL /HPF
WBC NRBC COR # BLD AUTO: 9.69 10*3/MM3 (ref 3.4–10.8)
WHOLE BLOOD HOLD COAG: NORMAL
WHOLE BLOOD HOLD SPECIMEN: NORMAL

## 2024-07-09 PROCEDURE — 81025 URINE PREGNANCY TEST: CPT | Performed by: NURSE PRACTITIONER

## 2024-07-09 PROCEDURE — 96374 THER/PROPH/DIAG INJ IV PUSH: CPT

## 2024-07-09 PROCEDURE — 96361 HYDRATE IV INFUSION ADD-ON: CPT

## 2024-07-09 PROCEDURE — 25810000003 SODIUM CHLORIDE 0.9 % SOLUTION: Performed by: NURSE PRACTITIONER

## 2024-07-09 PROCEDURE — 81001 URINALYSIS AUTO W/SCOPE: CPT | Performed by: EMERGENCY MEDICINE

## 2024-07-09 PROCEDURE — 25010000002 KETOROLAC TROMETHAMINE PER 15 MG: Performed by: NURSE PRACTITIONER

## 2024-07-09 PROCEDURE — 96375 TX/PRO/DX INJ NEW DRUG ADDON: CPT

## 2024-07-09 PROCEDURE — 83690 ASSAY OF LIPASE: CPT | Performed by: EMERGENCY MEDICINE

## 2024-07-09 PROCEDURE — 99284 EMERGENCY DEPT VISIT MOD MDM: CPT

## 2024-07-09 PROCEDURE — 80053 COMPREHEN METABOLIC PANEL: CPT | Performed by: EMERGENCY MEDICINE

## 2024-07-09 PROCEDURE — 85025 COMPLETE CBC W/AUTO DIFF WBC: CPT | Performed by: EMERGENCY MEDICINE

## 2024-07-09 PROCEDURE — 74176 CT ABD & PELVIS W/O CONTRAST: CPT

## 2024-07-09 PROCEDURE — 82550 ASSAY OF CK (CPK): CPT | Performed by: NURSE PRACTITIONER

## 2024-07-09 PROCEDURE — 25010000002 ONDANSETRON PER 1 MG: Performed by: NURSE PRACTITIONER

## 2024-07-09 PROCEDURE — 87637 SARSCOV2&INF A&B&RSV AMP PRB: CPT | Performed by: NURSE PRACTITIONER

## 2024-07-09 RX ORDER — DICYCLOMINE HCL 20 MG
20 TABLET ORAL EVERY 6 HOURS PRN
Qty: 20 TABLET | Refills: 0 | Status: SHIPPED | OUTPATIENT
Start: 2024-07-09 | End: 2024-07-14

## 2024-07-09 RX ORDER — ONDANSETRON 2 MG/ML
4 INJECTION INTRAMUSCULAR; INTRAVENOUS ONCE
Status: COMPLETED | OUTPATIENT
Start: 2024-07-09 | End: 2024-07-09

## 2024-07-09 RX ORDER — ONDANSETRON 4 MG/1
4 TABLET, ORALLY DISINTEGRATING ORAL EVERY 8 HOURS PRN
Qty: 9 TABLET | Refills: 0 | Status: SHIPPED | OUTPATIENT
Start: 2024-07-09 | End: 2024-07-12

## 2024-07-09 RX ORDER — DICYCLOMINE HYDROCHLORIDE 10 MG/1
20 CAPSULE ORAL ONCE
Status: COMPLETED | OUTPATIENT
Start: 2024-07-09 | End: 2024-07-09

## 2024-07-09 RX ORDER — KETOROLAC TROMETHAMINE 30 MG/ML
30 INJECTION, SOLUTION INTRAMUSCULAR; INTRAVENOUS ONCE
Status: COMPLETED | OUTPATIENT
Start: 2024-07-09 | End: 2024-07-09

## 2024-07-09 RX ORDER — SODIUM CHLORIDE 0.9 % (FLUSH) 0.9 %
10 SYRINGE (ML) INJECTION AS NEEDED
Status: DISCONTINUED | OUTPATIENT
Start: 2024-07-09 | End: 2024-07-09 | Stop reason: HOSPADM

## 2024-07-09 RX ADMIN — KETOROLAC TROMETHAMINE 30 MG: 30 INJECTION, SOLUTION INTRAMUSCULAR; INTRAVENOUS at 13:23

## 2024-07-09 RX ADMIN — DICYCLOMINE HYDROCHLORIDE 20 MG: 10 CAPSULE ORAL at 13:22

## 2024-07-09 RX ADMIN — SODIUM CHLORIDE 1000 ML: 9 INJECTION, SOLUTION INTRAVENOUS at 13:20

## 2024-07-09 RX ADMIN — ONDANSETRON 4 MG: 2 INJECTION INTRAMUSCULAR; INTRAVENOUS at 11:23

## 2024-07-09 RX ADMIN — SODIUM CHLORIDE 1000 ML: 9 INJECTION, SOLUTION INTRAVENOUS at 11:15

## 2024-07-09 NOTE — Clinical Note
[facility] EMERGENCY DEPARTMENT  [address] [address]  Phone: [phone]    Kate Mckeon was seen and treated in our emergency department on 7/9/2024.  She may return to work on 07/11/2024.         Thank you for choosing [facility].    Stephen Lanier MD

## 2024-07-09 NOTE — ED PROVIDER NOTES
Subjective:  History of Present Illness:    Patient is a 43-year-old female without contributing health history.  Presents to the ER today for body aches, chills cold sweats and lack of appetite since .  [place] and symptoms started shortly thereafter coming home.  Reports that she is nauseous and has abdominal tenderness.  She denies fever.  Denies OTC medication or home remedy.  Denies alleviating or exacerbating factors.    Nurses Notes reviewed and agree, including vitals, allergies, social history and prior medical history.     REVIEW OF SYSTEMS: All systems reviewed and not pertinent unless noted.  Review of Systems   Constitutional:  Positive for appetite change, chills and fatigue.   Gastrointestinal:  Positive for abdominal pain and nausea.   Musculoskeletal:  Positive for myalgias.   All other systems reviewed and are negative.      Past Medical History:   Diagnosis Date    Allergic     Seasonal    Headache     Scoliosis        Allergies:    Demerol [meperidine] and Prednisone      Past Surgical History:   Procedure Laterality Date    DIAGNOSTIC LAPAROSCOPY      for endometriosis    ENDOMETRIAL ABLATION      TUBAL ABDOMINAL LIGATION           Social History     Socioeconomic History    Marital status:    Tobacco Use    Smoking status: Former     Current packs/day: 0.00     Average packs/day: 1 pack/day for 5.0 years (5.0 ttl pk-yrs)     Types: Cigarettes     Start date: 2007     Quit date: 2012     Years since quittin.5     Passive exposure: Past    Smokeless tobacco: Never    Tobacco comments:     I do use a vape now   Vaping Use    Vaping status: Some Days    Substances: Nicotine    Devices: Pre-filled or refillable cartridge    Passive vaping exposure: Yes   Substance and Sexual Activity    Alcohol use: Yes     Alcohol/week: 2.0 standard drinks of alcohol     Types: 2 Glasses of wine per week    Drug use: No    Sexual  "activity: Yes     Partners: Male     Birth control/protection: Tubal ligation         Family History   Problem Relation Age of Onset    Hypertension Father     Cancer Paternal Grandmother         non-hodgkins lymphoma    Cancer Paternal Grandfather         prostate    Stroke Paternal Aunt     Breast cancer Neg Hx     Ovarian cancer Neg Hx        Objective  Physical Exam:  /64 (BP Location: Left arm, Patient Position: Sitting)   Pulse 98   Temp 100 °F (37.8 °C) (Oral)   Resp 18   Ht 166.4 cm (65.51\")   Wt 58.1 kg (128 lb)   LMP 07/03/2024 (Approximate)   SpO2 99%   BMI 20.97 kg/m²      Physical Exam  Vitals and nursing note reviewed.   Constitutional:       Appearance: Normal appearance. She is normal weight.   HENT:      Head: Normocephalic and atraumatic.      Nose: Nose normal.      Mouth/Throat:      Mouth: Mucous membranes are moist. Mucous membranes are dry.      Pharynx: Oropharynx is clear.   Eyes:      Extraocular Movements: Extraocular movements intact.      Conjunctiva/sclera: Conjunctivae normal.      Pupils: Pupils are equal, round, and reactive to light.   Cardiovascular:      Rate and Rhythm: Normal rate and regular rhythm.      Pulses: Normal pulses.      Heart sounds: Normal heart sounds.   Pulmonary:      Effort: Pulmonary effort is normal.      Breath sounds: Normal breath sounds.   Abdominal:      General: Abdomen is flat. Bowel sounds are normal.      Palpations: Abdomen is soft.      Tenderness: There is abdominal tenderness.   Musculoskeletal:         General: Normal range of motion.      Cervical back: Normal range of motion and neck supple.   Skin:     General: Skin is warm and dry.      Capillary Refill: Capillary refill takes less than 2 seconds.   Neurological:      General: No focal deficit present.      Mental Status: She is alert and oriented to person, place, and time. Mental status is at baseline.   Psychiatric:         Mood and Affect: Mood normal.         Behavior: " Behavior normal.         Thought Content: Thought content normal.         Judgment: Judgment normal.         Procedures    ED Course:         Lab Results (last 24 hours)       Procedure Component Value Units Date/Time    CBC & Differential [[id number]]  (Abnormal) Collected: 07/09/24 1114    Specimen: Blood Updated: 07/09/24 1122    Narrative:      The following orders were created for panel order CBC & Differential.  Procedure                               Abnormality         Status                     ---------                               -----------         ------                     CBC Auto Differential[[id number]]        Abnormal            Final result                 Please view results for these tests on the individual orders.    Comprehensive Metabolic Panel [[id number]]  (Abnormal) Collected: 07/09/24 1114    Specimen: Blood Updated: 07/09/24 1139     Glucose 107 mg/dL      BUN 16 mg/dL      Creatinine 0.69 mg/dL      Sodium 133 mmol/L      Potassium 4.1 mmol/L      Chloride 97 mmol/L      CO2 23.3 mmol/L      Calcium 8.8 mg/dL      Total Protein 6.8 g/dL      Albumin 3.8 g/dL      ALT (SGPT) 10 U/L      AST (SGOT) 15 U/L      Alkaline Phosphatase 104 U/L      Total Bilirubin 0.5 mg/dL      Globulin 3.0 gm/dL      A/G Ratio 1.3 g/dL      BUN/Creatinine Ratio 23.2     Anion Gap 12.7 mmol/L      eGFR 110.6 mL/min/1.73     Narrative:      GFR Normal >60  Chronic Kidney Disease <60  Kidney Failure <15      Lipase [[id number]]  (Abnormal) Collected: 07/09/24 1114    Specimen: Blood Updated: 07/09/24 1139     Lipase 12 U/L     CBC Auto Differential [[id number]]  (Abnormal) Collected: 07/09/24 1114    Specimen: Blood Updated: 07/09/24 1122     WBC 9.69 10*3/mm3      RBC 4.63 10*6/mm3      Hemoglobin 13.8 g/dL      Hematocrit 40.9 %      MCV 88.3 fL      MCH 29.8 pg      MCHC 33.7 g/dL      RDW 13.0 %      RDW-SD 41.9 fl      MPV 9.5 fL      Platelets 221 10*3/mm3      Neutrophil % 84.4 %      Lymphocyte % 6.9 %       Monocyte % 8.0 %      Eosinophil % 0.1 %      Basophil % 0.2 %      Immature Grans % 0.4 %      Neutrophils, Absolute 8.17 10*3/mm3      Lymphocytes, Absolute 0.67 10*3/mm3      Monocytes, Absolute 0.78 10*3/mm3      Eosinophils, Absolute 0.01 10*3/mm3      Basophils, Absolute 0.02 10*3/mm3      Immature Grans, Absolute 0.04 10*3/mm3      nRBC 0.0 /100 WBC     CK [[id number]]  (Normal) Collected: 07/09/24 1114    Specimen: Blood Updated: 07/09/24 1239     Creatine Kinase 59 U/L     COVID-19, FLU A/B, RSV PCR 1 HR TAT - Swab, Nasopharynx [[id number]]  (Normal) Collected: 07/09/24 1116    Specimen: Swab from Nasopharynx Updated: 07/09/24 1159     COVID19 Not Detected     Influenza A PCR Not Detected     Influenza B PCR Not Detected     RSV, PCR Not Detected    Narrative:      Fact sheet for providers: https://www.fda.gov/media/519819/download    Fact sheet for patients: https://www.fda.gov/media/101088/download    Test performed by PCR.    Urinalysis With Microscopic If Indicated (No Culture) - Urine, Clean Catch [[id number]]  (Abnormal) Collected: 07/09/24 1157    Specimen: Urine, Clean Catch Updated: 07/09/24 1208     Color, UA Yellow     Appearance, UA Cloudy     pH, UA 6.0     Specific Gravity, UA >=1.030     Glucose, UA Negative     Ketones, UA 80 mg/dL (3+)     Bilirubin, UA Negative     Blood, UA Negative     Protein, UA 30 mg/dL (1+)     Leuk Esterase, UA Negative     Nitrite, UA Negative     Urobilinogen, UA 1.0 E.U./dL    Pregnancy, Urine - Urine, Clean Catch [[id number]]  (Normal) Collected: 07/09/24 1157    Specimen: Urine, Clean Catch Updated: 07/09/24 1209     HCG, Urine QL Negative    Urinalysis, Microscopic Only - Urine, Clean Catch [[id number]]  (Abnormal) Collected: 07/09/24 1157    Specimen: Urine, Clean Catch Updated: 07/09/24 1232     RBC, UA 0-2 /HPF      WBC, UA 3-5 /HPF      Bacteria, UA 2+ /HPF      Squamous Epithelial Cells, UA 3-6 /HPF      Hyaline Casts, UA None Seen /LPF      Mucus, UA  Moderate/2+ /HPF      Methodology Manual Light Microscopy             CT Abdomen Pelvis Without Contrast    Result Date: 7/9/2024  PROCEDURE: CT ABDOMEN PELVIS WO CONTRAST-  HISTORY: rlq abd pain  COMPARISON: None .  PROCEDURE: Axial images were obtained from the lung bases through the pubic symphysis without intravenous contrast.  FINDINGS:  ABDOMEN: The lung bases are clear. The heart size is normal. The limited noncontrast images of the liver show a well-circumscribed hypodense probable cyst. The spleen is normal. No adrenal masses are seen.  The pancreas has an unremarkable unenhanced appearance. The aorta is normal in caliber. There is no significant free fluid or adenopathy.  There is no nephrolithiasis. There is no hydronephrosis. Limited noncontrast images of the bowel are unremarkable.  PELVIS: The appendix is not identified. The uterus is midline. The urinary bladder is collapsed. There is no significant fluid or adenopathy.      Impression: No hydronephrosis or nephrolithiasis.     CTDI: 4.63 mGy DLP: 213.82 mGy.cm   This study was performed with techniques to keep radiation doses as low as reasonably achievable (ALARA). Individualized dose reduction techniques using automated exposure control or adjustment of mA and/or kV according to the patient size were employed.     Images were reviewed, interpreted, and dictated by Dr. Yesy Carvajal MD Transcribed by Lissa Kelly PA-C.  This report was signed and finalized on 7/9/2024 1:12 PM by Yesy Carvajal MD.          MDM      Initial impression of presenting illness: Patient is a 43-year-old female without contributing health history.  Presents to the ER today for body aches, chills cold sweats and lack of appetite since Sunday.  [place] and symptoms started shortly thereafter coming home.  Reports that she is nauseous and has abdominal tenderness.  She denies fever.  Denies OTC medication or home remedy.   Denies alleviating or exacerbating factors.    DDX: includes but is not limited to: COVID, flu, rhinovirus, viral syndrome, heat exposure or other    Patient arrives stable with vitals interpreted by myself.     Pertinent features from physical exam: Lung sounds are clear bilaterally throughout.  Abdomen is soft.  There is generalized tenderness.  Bowel sounds normal.  Heart sounds are normal..    Initial diagnostic plan: CBC, CMP, lipase, urinalysis, urine pregnancy, CT abdomen pelvis without contrast    Results from initial plan were reviewed and interpreted by me revealing CBC is within appropriate range.  CMP is within appropriate range.  COVID flu and RSV were negative.  Urine is positive for ketones protein bacteria is negative for leukocytes there is presence of squamous epithelial cells most likely contaminated sample.  Urine pregnancy was negative.  CT abdomen pelvis with the following impression no hydronephrosis or nephrolithiasis    Diagnostic information from other sources: Chart review    Interventions / Re-evaluation: Vital signs stable throughout encounter.  Patient received 2 L normal saline.  20 mg of Bentyl 30 mg of Toradol    Results/clinical rationale were discussed with patient    Consultations/Discussion of results with other physicians: N/A    Disposition plan: Patient is hemodynamically stable nontoxic-appearing appropriate discharge.  Will have her follow-up with her PCP within the week.  Follow-up ER for new or worse symptoms.  -----        Final diagnoses:   Viral syndrome          Kory Tamayo, ISABEL  07/09/24 1417       Kory Tamayo, ISABEL  07/09/24 1417

## 2024-07-10 LAB — REF LAB TEST METHOD: NORMAL

## 2024-07-11 ENCOUNTER — NURSE TRIAGE (OUTPATIENT)
Dept: CALL CENTER | Facility: HOSPITAL | Age: 44
End: 2024-07-11
Payer: COMMERCIAL

## 2024-07-11 NOTE — TELEPHONE ENCOUNTER
Was in ED Tuesday for stomach problems associated with heat exhaustion. Given Bentyl and Zofran PRN. Stomach better. Now has rash. Still on bentyl. No more Zofran being taken. Woke up this AM with rash. Mild itching. No other symptoms. Will try antihistamines and if not improved or changes, will call back.     Also needs work excuse from ED. Sent to ED for assistance. They will supply.     Reason for Disposition   Hives or itching    Additional Information   Negative: [1] Life-threatening reaction (anaphylaxis) in the past to the same drug AND [2] < 2 hours since exposure   Negative: Difficulty breathing or wheezing   Negative: [1] Hoarseness or cough AND [2] started soon after 1st dose of drug   Negative: [1] Swollen tongue AND [2] started soon after 1st dose of drug   Negative: [1] Purple or blood-colored rash (spots or dots) AND [2] fever   Negative: Sounds like a life-threatening emergency to the triager   Negative: Rash is only on 1 part of the body (localized)   Negative: Taking new non-prescription (OTC) antihistamine, decongestant, ear drops, eye drops, or other OTC cough/cold medicine   Negative: Taking new prescription antihistamine, allergy medicine, asthma medicine, eye drops, ear drops or nose drops   Negative: Rash started more than 3 days after stopping new prescription medicine   Negative: Swollen tongue   Negative: [1] Widespread hives AND [2] onset < 2 hours of exposure to 1st dose of drug   Negative: Fever   Negative: Patient sounds very sick or weak to the triager   Negative: [1] Purple or blood-colored rash (spots or dots) AND [2] no fever AND [3] sounds well to triager   Negative: [1] Taking new prescription medication AND [2] rash within 4 hours of 1st dose   Negative: Large or small blisters on skin (i.e., fluid filled bubbles or sacs)   Negative: Bloody crusts on lips or sores in mouth   Negative: Face or lip swelling    Answer Assessment - Initial Assessment Questions  1. APPEARANCE of  "RASH: \"Describe the rash.\" (e.g., spots, blisters, raised areas, skin peeling, scaly)      Little red bumps started this AM. Across torso and abdomen  2. SIZE: \"How big are the spots?\" (e.g., tip of pen, eraser, coin; inches, centimeters)      See above   3. LOCATION: \"Where is the rash located?\"      See above   4. COLOR: \"What color is the rash?\" (Note: It is difficult to assess rash color in people with darker-colored skin. When this situation occurs, simply ask the caller to describe what they see.)      Red   5. ONSET: \"When did the rash begin?\"      This AM   6. FEVER: \"Do you have a fever?\" If Yes, ask: \"What is your temperature, how was it measured, and when did it start?\"      Not today   7. ITCHING: \"Does the rash itch?\" If Yes, ask: \"How bad is the itch?\" (Scale 1-10; or mild, moderate, severe)      Yes. Mild   8. CAUSE: \"What do you think is causing the rash?\"      meds  9. NEW MEDICINES: \"What new medicines are you taking?\" (e.g., name of antibiotic) \"When did you start taking this medication?\".      Was in ED Tuesday. Given Bentyl and Zofran PRN. Stomach better. Now has rash. Needs work excuse.  10. OTHER SYMPTOMS: \"Do you have any other symptoms?\" (e.g., sore throat, fever, joint pain)        no  11. PREGNANCY: \"Is there any chance you are pregnant?\" \"When was your last menstrual period?\"        na    Protocols used: Rash - Widespread On Drugs-ADULT-AH    "

## 2024-07-12 ENCOUNTER — TELEPHONE (OUTPATIENT)
Dept: FAMILY MEDICINE CLINIC | Facility: CLINIC | Age: 44
End: 2024-07-12

## 2024-07-12 NOTE — TELEPHONE ENCOUNTER
Caller: Kate Mckeon    Relationship: Self    Best call back number: [phone]     What test was performed: LABS     When was the test performed: 07/09/2024     Where was the test performed: IN OFFICE     Additional notes:   PATIENT WOULD LIKE FOR A CALL BACK REGARDING THE RESULTS OF HER LABS DRAWN IN THE OFFICE 07/09/2024

## 2024-07-25 DIAGNOSIS — K59.00 CONSTIPATION, UNSPECIFIED CONSTIPATION TYPE: ICD-10-CM

## 2024-07-25 RX ORDER — DOCUSATE SODIUM 250 MG
250 CAPSULE ORAL 2 TIMES DAILY PRN
Qty: 60 CAPSULE | Refills: 2 | Status: SHIPPED | OUTPATIENT
Start: 2024-07-25

## 2024-07-25 NOTE — TELEPHONE ENCOUNTER
Rx Refill Note  Requested Prescriptions     Pending Prescriptions Disp Refills    docusate sodium (COLACE) 250 MG capsule 30 capsule 0     Sig: Take 1 capsule by mouth 2 (Two) Times a Day As Needed for Constipation.      Last office visit with prescribing clinician: 6/28/2024   Last telemedicine visit with prescribing clinician: Visit date not found   Next office visit with prescribing clinician: 6/2/2025                         Would you like a call back once the refill request has been completed: [] Yes [] No    If the office needs to give you a call back, can they leave a voicemail: [] Yes [] No    Tatyana Burnette MA  07/25/24, 16:38 EDT

## 2024-10-30 ENCOUNTER — OFFICE VISIT (OUTPATIENT)
Dept: NEUROLOGY | Facility: CLINIC | Age: 44
End: 2024-10-30
Payer: COMMERCIAL

## 2024-10-30 VITALS
HEART RATE: 76 BPM | BODY MASS INDEX: 20.89 KG/M2 | WEIGHT: 130 LBS | SYSTOLIC BLOOD PRESSURE: 100 MMHG | HEIGHT: 66 IN | TEMPERATURE: 98.2 F | OXYGEN SATURATION: 99 % | DIASTOLIC BLOOD PRESSURE: 60 MMHG

## 2024-10-30 DIAGNOSIS — G43.109 MIGRAINE WITH AURA AND WITHOUT STATUS MIGRAINOSUS, NOT INTRACTABLE: ICD-10-CM

## 2024-10-30 RX ORDER — RIMEGEPANT SULFATE 75 MG/75MG
75 TABLET, ORALLY DISINTEGRATING ORAL
Qty: 16 TABLET | Refills: 5 | Status: SHIPPED | OUTPATIENT
Start: 2024-10-30

## 2024-10-30 RX ORDER — AMITRIPTYLINE HYDROCHLORIDE 10 MG/1
10 TABLET ORAL NIGHTLY
Qty: 90 TABLET | Refills: 1 | Status: SHIPPED | OUTPATIENT
Start: 2024-10-30

## 2024-10-30 NOTE — PROGRESS NOTES
Follow Up Office Visit      Patient Name: Kate Mckeon  : 1980   MRN: [id number]     Chief Complaint:    Chief Complaint   Patient presents with    Follow-up     Migraines; patient reports 2/30 Migraine days       History of Present Illness: Kate Mckeon is a 43 y.o. female who is here today to follow up with migraine HA's. She is doing well with [facility] QOD.  She reports only 2 migraine days in the past 30 days.  She says her pain is pulsating and behind the eyes and forehead. She says she has blurred vision and this is worse with migraine. She is due for eye exam. She says the blurred vision is worse without her glasses and says she is needing to wear them all of the time. She says she has a lot of pressure behind the eyes with her HA's. She denies any tingling or numbness. She has very occasioanl dizziness but not problematic.   -Home sleep study on 2024 did not reveal obstructive sleep apnea        -MRI of the brain with and without contrast on 2023 showed no acute intracranial abnormality.  It did note 5 tiny foci within normal limits for age.  Will repeat this at next office visit to check for stability     Pertinent Medical History: migraines, her father was diagnosed with supranuclear palsy    Denies any history of head injury.  She has had a tubal ligation.  She has 5 children.  Subjective      Review of Systems:   Review of Systems   Eyes:  Positive for blurred vision and photophobia.   Neurological:  Positive for headache.       I have reviewed and the following portions of the patient's history were updated as appropriate: past family history, past medical history, past social history, past surgical history and problem list.    Medications:     Current Outpatient Medications:     amitriptyline (ELAVIL) 10 MG tablet, Take 1 tablet by mouth Every Night., Disp: 90 tablet, Rfl: 1    docusate sodium (COLACE) 250 MG capsule, Take 1 capsule by mouth 2 (Two) Times a Day As  "Needed for Constipation., Disp: 60 capsule, Rfl: 2    Rimegepant Sulfate (Nurtec) 75 MG tablet dispersible tablet, Take 1 tablet by mouth Every Other Day As Needed (migraine)., Disp: 16 tablet, Rfl: 5    Allergies:   Allergies   Allergen Reactions    Demerol [Meperidine] Nausea And Vomiting    Prednisone Swelling       Objective     Physical Exam:  Vital Signs:   Vitals:    10/30/24 0832   BP: 100/60   Pulse: 76   Temp: 98.2 °F (36.8 °C)   SpO2: 99%   Weight: 59 kg (130 lb)   Height: 166.4 cm (65.5\")     Body mass index is 21.3 kg/m².    Physical Exam  Constitutional:       Appearance: Normal appearance.   HENT:      Head: Normocephalic.   Eyes:      General: Lids are normal.      Extraocular Movements: Extraocular movements intact.      Conjunctiva/sclera: Conjunctivae normal.   Pulmonary:      Effort: Pulmonary effort is normal.   Musculoskeletal:         General: Normal range of motion.   Skin:     General: Skin is warm and dry.   Neurological:      General: No focal deficit present.      Mental Status: She is alert and oriented to person, place, and time.      Cranial Nerves: Cranial nerves 2-12 are intact. No dysarthria.      Motor: Motor function is intact.      Coordination: Coordination is intact. Finger-Nose-Finger Test normal.      Gait: Gait is intact.   Psychiatric:         Attention and Perception: Attention normal.         Mood and Affect: Mood and affect normal.         Speech: Speech normal.         Behavior: Behavior normal. Behavior is cooperative.         Thought Content: Thought content normal.         Cognition and Memory: Cognition normal.         Judgment: Judgment normal.         Neurological Exam  Mental Status  Alert. Oriented to person, place and time. Oriented to person, place, and time. Recent and remote memory are intact. Speech is normal. no dysarthria present.    Cranial Nerves  CN III, IV, VI: Extraocular movements intact bilaterally. Normal lids and orbits bilaterally.  CN V: Facial " "sensation is normal.  CN VII: Full and symmetric facial movement.  CN IX, X: Palate elevates symmetrically  CN XI: Shoulder shrug strength is normal.  CN XII: Tongue midline without atrophy or fasciculations.    Motor  Normal muscle bulk throughout. Normal muscle tone. No abnormal involuntary movements.    Coordination    Finger-to-nose, rapid alternating movements and heel-to-shin normal bilaterally without dysmetria.    Gait   Normal gait.      Assessment / Plan      Assessment/Plan:   Diagnoses and all orders for this visit:    1. Migraine with aura and without status migrainosus, not intractable  -     Rimegepant Sulfate (Nurtec) 75 MG tablet dispersible tablet; Take 1 tablet by mouth Every Other Day As Needed (migraine).  Dispense: 16 tablet; Refill: 5  -     amitriptyline (ELAVIL) 10 MG tablet; Take 1 tablet by mouth Every Night.  Dispense: 90 tablet; Refill: 1         She will continue with Nurtec every other day and amitriptyline at at bedtime.  She is doing well on current regimen.  She will call today to schedule another eye exam due to some blurred vision and needing to wear her glasses \"all the time \".  Indications and side effects discussed with patient she verbalizes understanding.  Patient will call in the interim if she has any questions or concerns or changes.    Follow Up:   Return in about 6 months (around 4/30/2025).    ISABEL Eisenberg, FNP-[facility]     "

## 2024-12-17 ENCOUNTER — OFFICE VISIT (OUTPATIENT)
Dept: FAMILY MEDICINE CLINIC | Facility: CLINIC | Age: 44
End: 2024-12-17
Payer: COMMERCIAL

## 2024-12-17 VITALS
OXYGEN SATURATION: 97 % | TEMPERATURE: 98.4 F | WEIGHT: 130 LBS | RESPIRATION RATE: 18 BRPM | SYSTOLIC BLOOD PRESSURE: 110 MMHG | DIASTOLIC BLOOD PRESSURE: 74 MMHG | BODY MASS INDEX: 21.66 KG/M2 | HEIGHT: 65 IN | HEART RATE: 84 BPM

## 2024-12-17 DIAGNOSIS — J06.9 VIRAL UPPER RESPIRATORY INFECTION: Primary | ICD-10-CM

## 2024-12-17 DIAGNOSIS — R05.9 COUGH, UNSPECIFIED TYPE: ICD-10-CM

## 2024-12-17 LAB
EXPIRATION DATE: NORMAL
EXPIRATION DATE: NORMAL
FLUAV AG UPPER RESP QL IA.RAPID: NOT DETECTED
FLUBV AG UPPER RESP QL IA.RAPID: NOT DETECTED
INTERNAL CONTROL: NORMAL
INTERNAL CONTROL: NORMAL
Lab: NORMAL
Lab: NORMAL
S PYO AG THROAT QL: NEGATIVE
SARS-COV-2 AG UPPER RESP QL IA.RAPID: NOT DETECTED

## 2024-12-17 PROCEDURE — 99213 OFFICE O/P EST LOW 20 MIN: CPT | Performed by: STUDENT IN AN ORGANIZED HEALTH CARE EDUCATION/TRAINING PROGRAM

## 2024-12-17 PROCEDURE — 87428 SARSCOV & INF VIR A&B AG IA: CPT | Performed by: STUDENT IN AN ORGANIZED HEALTH CARE EDUCATION/TRAINING PROGRAM

## 2024-12-17 PROCEDURE — 87880 STREP A ASSAY W/OPTIC: CPT | Performed by: STUDENT IN AN ORGANIZED HEALTH CARE EDUCATION/TRAINING PROGRAM

## 2024-12-17 RX ORDER — BROMPHENIRAMINE MALEATE, PSEUDOEPHEDRINE HYDROCHLORIDE, AND DEXTROMETHORPHAN HYDROBROMIDE 2; 30; 10 MG/5ML; MG/5ML; MG/5ML
5 SYRUP ORAL 4 TIMES DAILY PRN
Qty: 118 ML | Refills: 0 | Status: SHIPPED | OUTPATIENT
Start: 2024-12-17

## 2024-12-17 NOTE — PROGRESS NOTES
"    Same Day Office Visit      Date: 2024   Patient Name: Kate Mckeon  : 1980   MRN: [id number]     Chief Complaint:    Chief Complaint   Patient presents with    Cough     Non productive, symptoms started     Sinusitis     Pressure behind eyes, sneezing, bilateral ear pain, symptoms started , sore throat at night       History of Present Illness: Kate Mckeon is a 44 y.o. female who is here today for upper respiratory symptoms.    Patient reports she developed fatigue, sore throat especially at night, ear itching, sinus pressure and cough.  Patient reports that symptoms initially began  evening.  Patient reports recent exposure to family member with COVID-19 shortly after [place] though has not had known exposure since that time.    Subjective     I have reviewed the patients family history, social history, past medical history, past surgical history and have updated it as appropriate.     Medications:     Current Outpatient Medications:     amitriptyline (ELAVIL) 10 MG tablet, Take 1 tablet by mouth Every Night., Disp: 90 tablet, Rfl: 1    docusate sodium (COLACE) 250 MG capsule, Take 1 capsule by mouth 2 (Two) Times a Day As Needed for Constipation., Disp: 60 capsule, Rfl: 2    Rimegepant Sulfate (Nurtec) 75 MG tablet dispersible tablet, Take 1 tablet by mouth Every Other Day As Needed (migraine)., Disp: 16 tablet, Rfl: 5    brompheniramine-pseudoephedrine-DM 30-2-10 MG/5ML syrup, Take 5 mL by mouth 4 (Four) Times a Day As Needed for Allergies., Disp: 118 mL, Rfl: 0    Allergies:   Allergies   Allergen Reactions    Demerol [Meperidine] Nausea And Vomiting    Prednisone Swelling       Objective     Physical Exam:     Vital Signs:   Vitals:    24 1434   BP: 110/74   Pulse: 84   Resp: 18   Temp: 98.4 °F (36.9 °C)   TempSrc: Oral   SpO2: 97%   Weight: 59 kg (130 lb)   Height: 165.1 cm (65\")     Body mass index is 21.63 kg/m².  BMI is within normal parameters. " No other follow-up for BMI required.       Physical Exam     General:  Non-toxic appearing adult female in no acute distress. Alert and oriented. Vitals reviewed and are within normal limits.  Head/ENT: Atraumatic. No facial/sinus tenderness to palpation. There are small effusions behind both tympanic membranes bilaterally without erythema or bulge.  Oropharyngeal cavity shows erythema with postnasal drip.  No obvious edema or exudate.  Neck: Anatomy appears symmetrical. There is no palpable lymphadenopathy to palpation.  Cardiac: Regular rate and rhythm to auscultation.   Pulmonary: Lungs are clear to auscultation bilaterally.  Integumentary/Skin: Skin appears unremarkable from observable skin surfaces.   Neurological: Normal gait and speech.    Procedures    Assessment / Plan      1. Viral upper respiratory infection  - brompheniramine-pseudoephedrine-DM 30-2-10 MG/5ML syrup; Take 5 mL by mouth 4 (Four) Times a Day As Needed for Allergies.  Dispense: 118 mL; Refill: 0    2. Cough, unspecified type  - POCT SARS-CoV-2 Antigen SHANNON + Flu  - POCT rapid strep A     Patient presents today with symptoms of fatigue, sore throat especially at night, ear itching, sinus pressure and cough.  Patient is afebrile and hemodynamically stable on room air.  Physical examination is largely unremarkable other than postnasal drip and small effusions in bilateral ears without evidence of erythema or bulge.  COVID and flu negative in office today.  Patient's symptoms likely secondary to viral upper respiratory infection.  Have called in Bromfed for symptomatic relief.  Have discussed that patient can use Tylenol/ibuprofen for fever/muscle aches.  Have recommended adequate fluid intake and rest.  Have encouraged patient to return if symptoms do not improve and/or worsen.    Follow Up:   No follow-ups on file.      MD KAVIN MartinesE PC [facility]  [address] [place] [address] DR WORKMAN  [phone]  Fax [phone]  Phone [phone]

## 2025-04-30 ENCOUNTER — OFFICE VISIT (OUTPATIENT)
Dept: NEUROLOGY | Facility: CLINIC | Age: 45
End: 2025-04-30
Payer: COMMERCIAL

## 2025-04-30 VITALS
BODY MASS INDEX: 21.66 KG/M2 | HEIGHT: 65 IN | SYSTOLIC BLOOD PRESSURE: 104 MMHG | HEART RATE: 74 BPM | OXYGEN SATURATION: 98 % | WEIGHT: 130 LBS | DIASTOLIC BLOOD PRESSURE: 60 MMHG | TEMPERATURE: 98.6 F

## 2025-04-30 DIAGNOSIS — R41.89 SUBJECTIVE MEMORY COMPLAINTS: Primary | ICD-10-CM

## 2025-04-30 DIAGNOSIS — G43.109 MIGRAINE WITH AURA AND WITHOUT STATUS MIGRAINOSUS, NOT INTRACTABLE: ICD-10-CM

## 2025-04-30 DIAGNOSIS — R93.0 ABNORMAL MRI OF HEAD: ICD-10-CM

## 2025-04-30 PROCEDURE — 99214 OFFICE O/P EST MOD 30 MIN: CPT | Performed by: NURSE PRACTITIONER

## 2025-04-30 RX ORDER — AMITRIPTYLINE HYDROCHLORIDE 10 MG/1
10 TABLET ORAL NIGHTLY
Qty: 90 TABLET | Refills: 1 | Status: SHIPPED | OUTPATIENT
Start: 2025-04-30

## 2025-04-30 RX ORDER — TOLMETIN SODIUM 400 MG/1
1 CAPSULE ORAL 3 TIMES DAILY
COMMUNITY
Start: 2025-04-18

## 2025-04-30 RX ORDER — TESTOSTERONE 20.25 MG/1.25G
GEL TOPICAL
COMMUNITY

## 2025-04-30 NOTE — PROGRESS NOTES
"     Follow Up Office Visit      Patient Name: Kate Mckeon  : 1980   MRN: [id number]     Chief Complaint:    Chief Complaint   Patient presents with    Follow-up     Migraines; reports more frequent headaches with medication changes       History of Present Illness: Kate Mckeon is a 44 y.o. female who is here today to follow up with migraines. She says she has been having more headaches. She says  she was woken out of her sleep and says she felt like she was hit with a ball bat. She says Nurtec helped and had dizziness. She has pressure behind the eyes, sensitive to smell and light as well as noise.   She has been having pain in the back of the head and neck pain and tightness. Massage can help with these HA's.  She denies any tingling or numbness that is consistent. States she has numbness in the left arm/hand when she is leaning on that arm but has tennis elbow per her report. She says she has been having trouble with her short term memory as well and this is new.   -had recent eye exam and has to be seen again in July as she says she has a place that is \"losing pigment\". She has a new Rx for lenses and is trying to get used to these.   -Her father is in the hospital right now and this is an added stressor for her.  -Home sleep study on 2024 did not reveal obstructive sleep apnea        -MRI of the brain with and without contrast on 2023 showed no acute intracranial abnormality.  It did note 5 tiny foci within normal limits for age.  Will repeat this at next office visit to check for stability     Pertinent Medical History: migraines, her father was diagnosed with supranuclear palsy    Denies any history of head injury.  She has had a tubal ligation.  She has 5 children.    Subjective      Review of Systems:   Review of Systems   Eyes:  Positive for photophobia.   Neurological:  Positive for headache and memory problem.       I have reviewed and the following portions of the " "patient's history were updated as appropriate: past family history, past medical history, past social history, past surgical history and problem list.    Medications:     Current Outpatient Medications:     amitriptyline (ELAVIL) 10 MG tablet, Take 1 tablet by mouth Every Night., Disp: 90 tablet, Rfl: 1    docusate sodium (COLACE) 250 MG capsule, Take 1 capsule by mouth 2 (Two) Times a Day As Needed for Constipation., Disp: 60 capsule, Rfl: 2    rimegepant sulfate ODT (Nurtec) 75 MG disintegrating tablet, Place 1 tablet under the tongue Every Other Day As Needed (migraine)., Disp: 16 tablet, Rfl: 5    Testosterone 1.62 % gel, Place  on the skin as directed by provider., Disp: , Rfl:     TESTOSTERONE BU, Apply 0.5ml (2 clicks) topically & rub in once daily as directed at bedtime, Disp: , Rfl:     tolmetin (TOLECTIN) 400 MG capsule, Take 1 capsule by mouth 3 times a day., Disp: , Rfl:     Allergies:   Allergies   Allergen Reactions    Demerol [Meperidine] Nausea And Vomiting    Prednisone Swelling       Objective     Physical Exam:  Vital Signs:   Vitals:    04/30/25 0837   BP: 104/60   Pulse: 74   Temp: 98.6 °F (37 °C)   SpO2: 98%   Weight: 59 kg (130 lb)   Height: 165.1 cm (65\")   PainSc: 0-No pain     Body mass index is 21.63 kg/m².    Physical Exam  Constitutional:       Appearance: Normal appearance.   HENT:      Head: Normocephalic.   Eyes:      General: Lids are normal.      Extraocular Movements: Extraocular movements intact.      Conjunctiva/sclera: Conjunctivae normal.   Pulmonary:      Effort: Pulmonary effort is normal.   Musculoskeletal:         General: Normal range of motion.   Skin:     General: Skin is warm and dry.   Neurological:      General: No focal deficit present.      Mental Status: She is alert and oriented to person, place, and time.      Cranial Nerves: Cranial nerves 2-12 are intact. No dysarthria.      Motor: Motor strength is normal.Motor function is intact. No tremor or pronator drift. "      Coordination: Romberg sign negative. Finger-Nose-Finger Test normal.   Psychiatric:         Attention and Perception: Attention normal.         Mood and Affect: Mood and affect normal.         Speech: Speech normal.         Behavior: Behavior normal. Behavior is cooperative.         Thought Content: Thought content normal.         Cognition and Memory: Cognition normal.         Judgment: Judgment normal.         Neurological Exam  Mental Status  Alert. Oriented to person, place, time and situation. Oriented to person, place, and time. Recent and remote memory are intact. Speech is normal. no dysarthria present. Language is fluent with no aphasia. Attention and concentration are normal.    Cranial Nerves  CN III, IV, VI: Extraocular movements intact bilaterally. Normal lids and orbits bilaterally.  CN V: Facial sensation is normal.  CN VII: Full and symmetric facial movement.  CN IX, X: Palate elevates symmetrically  CN XI: Shoulder shrug strength is normal.  CN XII: Tongue midline without atrophy or fasciculations.    Motor  Normal muscle bulk throughout. No fasciculations present. Normal muscle tone. No abnormal involuntary movements. Strength is 5/5 throughout all four extremities.    Coordination  No tremorRight: Finger-to-nose normal. Rapid alternating movement normal.    Gait  Casual gait is normal including stance, stride, and arm swing.Normal toe walking. Normal heel walking. Normal tandem gait. Romberg is absent. Able to rise from chair without using arms.      Assessment / Plan      Assessment/Plan:   Diagnoses and all orders for this visit:    1. Subjective memory complaints (Primary)  -     MRI Brain With & Without Contrast; Future    2. Abnormal MRI of head  -     MRI Brain With & Without Contrast; Future    3. Migraine with aura and without status migrainosus, not intractable  -     amitriptyline (ELAVIL) 10 MG tablet; Take 1 tablet by mouth Every Night.  Dispense: 90 tablet; Refill: 1  -      rimegepant sulfate ODT (Nurtec) 75 MG disintegrating tablet; Place 1 tablet under the tongue Every Other Day As Needed (migraine).  Dispense: 16 tablet; Refill: 5           This is a pleasant 44-year-old female patient here to follow-up with migraine headaches.  She feels that some of these are becoming more severe.  Her prior MRI from November 2023 did show 5 tiny foci of abnormal T2 flair.  We will recheck this for stability so MRI of the brain with and without contrast has been ordered.  She also notes that she has been having some short-term memory issues and states that her spouse has also been noting with her because she is unable to remember recent things that were told to her and often repeats herself.  Refills given of Nurtec and amitriptyline.  Indications and side effects discussed with patient she verbalizes understanding.  Patient will call in the interim if she has any questions or concerns or changes.    Follow Up:   Return in about 3 months (around 7/30/2025).    ISABEL Eisenberg, FNP-BC  [facility]

## 2025-06-02 ENCOUNTER — OFFICE VISIT (OUTPATIENT)
Dept: FAMILY MEDICINE CLINIC | Facility: CLINIC | Age: 45
End: 2025-06-02
Payer: COMMERCIAL

## 2025-06-02 VITALS
OXYGEN SATURATION: 98 % | RESPIRATION RATE: 16 BRPM | WEIGHT: 130.8 LBS | SYSTOLIC BLOOD PRESSURE: 124 MMHG | BODY MASS INDEX: 21.79 KG/M2 | DIASTOLIC BLOOD PRESSURE: 80 MMHG | HEIGHT: 65 IN | HEART RATE: 78 BPM

## 2025-06-02 DIAGNOSIS — Z00.00 ANNUAL PHYSICAL EXAM: Primary | ICD-10-CM

## 2025-06-02 DIAGNOSIS — G43.109 MIGRAINE WITH AURA AND WITHOUT STATUS MIGRAINOSUS, NOT INTRACTABLE: ICD-10-CM

## 2025-06-02 DIAGNOSIS — Z12.31 ENCOUNTER FOR SCREENING MAMMOGRAM FOR MALIGNANT NEOPLASM OF BREAST: ICD-10-CM

## 2025-06-02 DIAGNOSIS — G47.00 INSOMNIA, UNSPECIFIED TYPE: ICD-10-CM

## 2025-06-02 PROCEDURE — 99396 PREV VISIT EST AGE 40-64: CPT | Performed by: NURSE PRACTITIONER

## 2025-06-02 NOTE — PROGRESS NOTES
"      Subjective     Chief Complaint:    Chief Complaint   Patient presents with    Annual Exam       History of Present Illness:   Here for annual exam  Recent hysteroscopy with GYN in [place] for endometrial polyp, she is recovering well   Insomnia, elavil  Migraines, follows with neuro, on nurtec, working well, having 4+ per month   Normal bm  Normal uop  No excessive anxiety or depression   Allergies are flared   Brief smoking history, quit over 20 years ago   1 glass of wine weekly   Wide variety diet   Wears seat belt   No structured exercise but is active         Review of Systems  Gen- No fevers, chills  CV- No chest pain, palpitations  Resp- No cough, dyspnea  GI- No N/V/D, abd pain  Neuro-No dizziness, headaches      I have reviewed and/or updated the patient's past medical, surgical, family, social history and problem list as appropriate.     Medications:    Current Outpatient Medications:     amitriptyline (ELAVIL) 10 MG tablet, Take 1 tablet by mouth Every Night., Disp: 90 tablet, Rfl: 1    docusate sodium (COLACE) 250 MG capsule, Take 1 capsule by mouth 2 (Two) Times a Day As Needed for Constipation., Disp: 60 capsule, Rfl: 2    rimegepant sulfate ODT (Nurtec) 75 MG disintegrating tablet, Place 1 tablet under the tongue Every Other Day As Needed (migraine)., Disp: 16 tablet, Rfl: 5    TESTOSTERONE BU, Apply 0.5ml (2 clicks) topically & rub in once daily as directed at bedtime, Disp: , Rfl:     tolmetin (TOLECTIN) 400 MG capsule, Take 1 capsule by mouth 3 times a day., Disp: , Rfl:     Allergies:  Allergies   Allergen Reactions    Demerol [Meperidine] Nausea And Vomiting    Prednisone Swelling       Objective     Vital Signs:   Vitals:    06/02/25 1508   BP: 124/80   Pulse: 78   Resp: 16   SpO2: 98%   Weight: 59.3 kg (130 lb 12.8 oz)   Height: 165.1 cm (65\")     Body mass index is 21.77 kg/m².    Physical Exam:    Physical Exam  Constitutional:       Appearance: Normal appearance. She is " well-developed.   HENT:      Head: Normocephalic and atraumatic.      Right Ear: Tympanic membrane, ear canal and external ear normal.      Left Ear: Tympanic membrane, ear canal and external ear normal.      Nose: Nose normal.      Mouth/Throat:      Pharynx: Uvula midline.   Eyes:      Pupils: Pupils are equal, round, and reactive to light.   Cardiovascular:      Rate and Rhythm: Normal rate and regular rhythm.      Heart sounds: Normal heart sounds. No murmur heard.     No friction rub. No gallop.   Pulmonary:      Effort: Pulmonary effort is normal.      Breath sounds: Normal breath sounds.   Abdominal:      General: Bowel sounds are normal.      Palpations: Abdomen is soft.      Tenderness: There is no abdominal tenderness.   Musculoskeletal:      Cervical back: Neck supple.   Lymphadenopathy:      Head:      Right side of head: No submental, submandibular, tonsillar, preauricular or posterior auricular adenopathy.      Left side of head: No submental, submandibular, tonsillar, preauricular or posterior auricular adenopathy.      Cervical: No cervical adenopathy.   Skin:     General: Skin is warm and dry.   Neurological:      General: No focal deficit present.      Mental Status: She is alert and oriented to person, place, and time.   Psychiatric:         Mood and Affect: Mood normal.         Behavior: Behavior normal.         Assessment / Plan     Assessment/Plan:   Problem List Items Addressed This Visit    None  Visit Diagnoses         Annual physical exam    -  Primary      Encounter for screening mammogram for malignant neoplasm of breast        Relevant Orders    Mammo Screening Digital Tomosynthesis Bilateral With CAD      Migraine with aura and without status migrainosus, not intractable          Insomnia, unspecified type              -- physical performed today, encouraged clean eating, whole foods, limit processed and sugary foods, exercise 150min/week as tolerated  -- mammogram  -- continue f/u with  specialist       Discussed plan of care in detail with pt today; pt verb understanding and agrees.    Follow up:  Yearly       Electronically signed by ISABEL Stevenson   06/02/2025 15:29 EDT      Please note that portions of this note were completed with a voice recognition program.

## 2025-06-16 ENCOUNTER — HOSPITAL ENCOUNTER (OUTPATIENT)
Dept: MRI IMAGING | Facility: HOSPITAL | Age: 45
Discharge: HOME | End: 2025-06-16
Admitting: NURSE PRACTITIONER
Payer: COMMERCIAL

## 2025-06-16 DIAGNOSIS — R41.89 SUBJECTIVE MEMORY COMPLAINTS: ICD-10-CM

## 2025-06-16 DIAGNOSIS — R93.0 ABNORMAL MRI OF HEAD: ICD-10-CM

## 2025-06-16 PROCEDURE — 70553 MRI BRAIN STEM W/O & W/DYE: CPT

## 2025-06-16 PROCEDURE — 25510000002 GADOBENATE DIMEGLUMINE 529 MG/ML SOLUTION: Performed by: NURSE PRACTITIONER

## 2025-06-16 PROCEDURE — A9577 INJ MULTIHANCE: HCPCS | Performed by: NURSE PRACTITIONER

## 2025-06-16 RX ADMIN — GADOBENATE DIMEGLUMINE 11 ML: 529 INJECTION, SOLUTION INTRAVENOUS at 07:21

## 2025-07-29 ENCOUNTER — OFFICE VISIT (OUTPATIENT)
Dept: NEUROLOGY | Facility: CLINIC | Age: 45
End: 2025-07-29
Payer: COMMERCIAL

## 2025-07-29 VITALS
HEIGHT: 65 IN | BODY MASS INDEX: 21.83 KG/M2 | OXYGEN SATURATION: 98 % | TEMPERATURE: 98.4 F | WEIGHT: 131 LBS | SYSTOLIC BLOOD PRESSURE: 110 MMHG | DIASTOLIC BLOOD PRESSURE: 70 MMHG | HEART RATE: 64 BPM

## 2025-07-29 DIAGNOSIS — G43.109 MIGRAINE WITH AURA AND WITHOUT STATUS MIGRAINOSUS, NOT INTRACTABLE: ICD-10-CM

## 2025-07-29 RX ORDER — PROPRANOLOL HYDROCHLORIDE 10 MG/1
10 TABLET ORAL 2 TIMES DAILY
Qty: 60 TABLET | Refills: 2 | Status: SHIPPED | OUTPATIENT
Start: 2025-07-29

## 2025-07-29 RX ORDER — AMITRIPTYLINE HYDROCHLORIDE 10 MG/1
10 TABLET ORAL NIGHTLY
Qty: 90 TABLET | Refills: 1 | Status: SHIPPED | OUTPATIENT
Start: 2025-07-29

## 2025-07-29 NOTE — PROGRESS NOTES
Follow Up Office Visit      Patient Name: Kate Mckeon  : 1980   MRN: [id number]     Chief Complaint:    Chief Complaint   Patient presents with    Follow-up     Migraine       History of Present Illness: Kate Mckeon is a 44 y.o. female who is here today to follow up with migraines. She had a repeat eye exam last week to check the pigmentation changes behind the retina and this is stable. She is to start on Luetin.   She says she has been having 1-2 MD's per week. She has been to chiropractor and says this used to trigger migraine. She says she has tightness in her neck and this triggers them. She has nasal congestion right before and during the migraine and it goes away with the migraine medication. She says Nurtec generally helps take the migraine away. She denies any changes. Dizziness has been less and she has associated photophobia and pressure behind the eyes.       -Repeat MRI brain W/WO 2025:  COMPARISON: 2023     TECHNIQUE: Multiplanar imaging was performed of the brain with and  without Gadolinium infusion.     FINDINGS: Diffusion sequences show no signal abnormalities to indicate  acute infarct or other process.     Brain parenchyma displays no evidence of mass, hemorrhage or edema.  There is a punctate focus of white matter signal in the left frontal  lobe which is stable. This is of doubtful significance, likely due to  chronic minimal microvascular disease. No extra-axial abnormal findings  are seen. The ventricles and cisterns appear normal. No abnormal  enhancing lesions are identified on the post infusion images.     IMPRESSION:  No acute findings. No abnormal enhancement.       History of Present Illness been carried forward from her 2025 office note as follows: Kate Mckeon is a 44 y.o. female who is here today to follow up with migraines. She says she has been having more headaches. She says  she was woken out of her sleep and says she  "felt like she was hit with a ball bat. She says Nurtec helped and had dizziness. She has pressure behind the eyes, sensitive to smell and light as well as noise.   She has been having pain in the back of the head and neck pain and tightness. Massage can help with these HA's.  She denies any tingling or numbness that is consistent. States she has numbness in the left arm/hand when she is leaning on that arm but has tennis elbow per her report. She says she has been having trouble with her short term memory as well and this is new.   -had recent eye exam and has to be seen again in July as she says she has a place that is \"losing pigment\". She has a new Rx for lenses and is trying to get used to these.   -Her father is in the hospital right now and this is an added stressor for her.  -Home sleep study on 5/22/2024 did not reveal obstructive sleep apnea        -MRI of the brain with and without contrast on 11/6/2023 showed no acute intracranial abnormality.  It did note 5 tiny foci within normal limits for age.  Will repeat this at next office visit to check for stability     Pertinent Medical History: migraines, her father was diagnosed with supranuclear palsy    Denies any history of head injury.  She has had a tubal ligation.  She has 5 children.    Subjective      Review of Systems:   Review of Systems   Eyes:  Positive for photophobia.   Neurological:  Positive for headache.       I have reviewed and the following portions of the patient's history were updated as appropriate: past family history, past medical history, past social history, past surgical history and problem list.    Medications:     Current Outpatient Medications:     amitriptyline (ELAVIL) 10 MG tablet, Take 1 tablet by mouth Every Night., Disp: 90 tablet, Rfl: 1    docusate sodium (COLACE) 250 MG capsule, Take 1 capsule by mouth 2 (Two) Times a Day As Needed for Constipation., Disp: 60 capsule, Rfl: 2    rimegepant sulfate ODT (Nurtec) 75 MG " "disintegrating tablet, Place 1 tablet under the tongue Every Other Day As Needed (migraine)., Disp: 16 tablet, Rfl: 5    TESTOSTERONE BU, Apply 0.5ml (2 clicks) topically & rub in once daily as directed at bedtime, Disp: , Rfl:     tolmetin (TOLECTIN) 400 MG capsule, Take 1 capsule by mouth 3 times a day., Disp: , Rfl:     propranolol (INDERAL) 10 MG tablet, Take 1 tablet by mouth 2 (Two) Times a Day., Disp: 60 tablet, Rfl: 2    Allergies:   Allergies   Allergen Reactions    Demerol [Meperidine] Nausea And Vomiting    Prednisone Swelling       Objective     Physical Exam:  Vital Signs:   Vitals:    07/29/25 0822   BP: 110/70   Pulse: 64   Temp: 98.4 °F (36.9 °C)   SpO2: 98%   Weight: 59.4 kg (131 lb)   Height: 165.1 cm (65\")   PainSc: 0-No pain     Body mass index is 21.8 kg/m².    Physical Exam  Constitutional:       Appearance: Normal appearance.   HENT:      Head: Normocephalic.   Eyes:      Conjunctiva/sclera: Conjunctivae normal.   Pulmonary:      Effort: Pulmonary effort is normal.   Musculoskeletal:         General: Normal range of motion.   Skin:     General: Skin is warm and dry.   Neurological:      General: No focal deficit present.      Mental Status: She is alert and oriented to person, place, and time.      Cranial Nerves: Cranial nerves 2-12 are intact. No dysarthria.      Motor: Motor function is intact. No tremor.      Gait: Gait is intact.   Psychiatric:         Attention and Perception: Attention normal.         Mood and Affect: Mood and affect normal.         Speech: Speech normal.         Behavior: Behavior normal. Behavior is cooperative.         Thought Content: Thought content normal.         Cognition and Memory: Cognition normal.         Judgment: Judgment normal.         Neurological Exam  Mental Status  Alert. Oriented to person, place, and time. Speech is normal. no dysarthria present.    Coordination  No tremor    Gait   Normal gait.      Assessment / Plan      Assessment/Plan: "   Diagnoses and all orders for this visit:    1. Migraine with aura and without status migrainosus, not intractable  -     amitriptyline (ELAVIL) 10 MG tablet; Take 1 tablet by mouth Every Night.  Dispense: 90 tablet; Refill: 1  -     rimegepant sulfate ODT (Nurtec) 75 MG disintegrating tablet; Place 1 tablet under the tongue Every Other Day As Needed (migraine).  Dispense: 16 tablet; Refill: 5  -     propranolol (INDERAL) 10 MG tablet; Take 1 tablet by mouth 2 (Two) Times a Day.  Dispense: 60 tablet; Refill: 2       Will add propranolol 10 mg daily and may increase to BID dosing after 1 week. Indications and side effects discussed with patient she verbalizes understanding.  Refills given of amitriptyline and Nurtec. Patient will call in the interim if she has any questions or concerns or changes.    Follow Up:   Return in about 6 months (around 1/29/2026).    ISABEL Eisenberg, FNP-BC  [facility]